# Patient Record
Sex: FEMALE | Race: WHITE | NOT HISPANIC OR LATINO | Employment: PART TIME | URBAN - NONMETROPOLITAN AREA
[De-identification: names, ages, dates, MRNs, and addresses within clinical notes are randomized per-mention and may not be internally consistent; named-entity substitution may affect disease eponyms.]

---
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
## 2018-03-14 ENCOUNTER — OFFICE VISIT (OUTPATIENT)
Dept: OBSTETRICS AND GYNECOLOGY | Facility: CLINIC | Age: 18
End: 2018-03-14

## 2018-03-14 VITALS
SYSTOLIC BLOOD PRESSURE: 100 MMHG | HEART RATE: 79 BPM | BODY MASS INDEX: 22.26 KG/M2 | DIASTOLIC BLOOD PRESSURE: 60 MMHG | WEIGHT: 130.4 LBS | RESPIRATION RATE: 14 BRPM | HEIGHT: 64 IN

## 2018-03-14 DIAGNOSIS — Z30.41 ORAL CONTRACEPTIVE PILL SURVEILLANCE: ICD-10-CM

## 2018-03-14 PROCEDURE — 99213 OFFICE O/P EST LOW 20 MIN: CPT | Performed by: NURSE PRACTITIONER

## 2018-03-14 RX ORDER — DESOGESTREL AND ETHINYL ESTRADIOL 0.15-0.03
1 KIT ORAL DAILY
Qty: 28 TABLET | Refills: 12 | Status: SHIPPED | OUTPATIENT
Start: 2018-03-14 | End: 2019-03-14

## 2018-03-14 NOTE — PROGRESS NOTES
Subjective   Kenia Casas is a 17 y.o. female.     History of Present Illness   Pt presents for 3 month follow up on initiation of OCP. Periods are regular, 6 days, moderate flow with mild cramps. Pt states she is doing well without any concerns. BP WNL. No weight gain, nausea, or headaches. Denies missing any pills.    Patient's last menstrual period was 03/05/2018 (approximate).    The following portions of the patient's history were reviewed and updated as appropriate: allergies, current medications, past family history, past medical history, past social history, past surgical history and problem list.    Review of Systems   Constitutional: Negative.  Negative for unexpected weight gain.   Respiratory: Negative.    Cardiovascular: Negative.    Gastrointestinal: Negative for nausea.   Genitourinary: Negative.  Negative for menstrual problem.   Neurological: Negative for headaches.       Objective    Vitals:    03/14/18 0906   BP: 100/60   Pulse: 79   Resp: 14     1    03/14/18  0906   Weight: 59.1 kg (130 lb 6.4 oz)     Body mass index is 22.38 kg/m².    Physical Exam   Constitutional: She is oriented to person, place, and time. She appears well-developed and well-nourished.   Cardiovascular: Normal rate, regular rhythm and normal heart sounds.    Pulmonary/Chest: Effort normal and breath sounds normal.   Neurological: She is alert and oriented to person, place, and time.   Psychiatric: She has a normal mood and affect. Her behavior is normal.   Vitals reviewed.    Assessment/Plan   Kenia was seen today for follow-up.    Diagnoses and all orders for this visit:    Oral contraceptive pill surveillance  -     desogestrel-ethinyl estradiol (APRI) 0.15-30 MG-MCG per tablet; Take 1 tablet by mouth Daily.      Continue OCP daily as prescribed. Reminded what to do if dose is missed. All questions answered. Follow up in 1 year or sooner PRN.

## 2019-03-14 DIAGNOSIS — Z30.41 ORAL CONTRACEPTIVE PILL SURVEILLANCE: ICD-10-CM

## 2019-03-18 RX ORDER — DESOGESTREL AND ETHINYL ESTRADIOL 0.15-0.03
KIT ORAL
Qty: 28 TABLET | Refills: 12 | OUTPATIENT
Start: 2019-03-18

## 2019-03-19 ENCOUNTER — OFFICE VISIT (OUTPATIENT)
Dept: OBSTETRICS AND GYNECOLOGY | Facility: CLINIC | Age: 19
End: 2019-03-19

## 2019-03-19 VITALS
SYSTOLIC BLOOD PRESSURE: 102 MMHG | DIASTOLIC BLOOD PRESSURE: 60 MMHG | WEIGHT: 134.8 LBS | HEIGHT: 64 IN | HEART RATE: 76 BPM | BODY MASS INDEX: 23.01 KG/M2

## 2019-03-19 DIAGNOSIS — Z30.41 ORAL CONTRACEPTIVE PILL SURVEILLANCE: Primary | ICD-10-CM

## 2019-03-19 PROBLEM — L30.9 ECZEMA: Status: ACTIVE | Noted: 2018-12-27

## 2019-03-19 PROCEDURE — 99213 OFFICE O/P EST LOW 20 MIN: CPT | Performed by: NURSE PRACTITIONER

## 2019-03-19 RX ORDER — DESOGESTREL AND ETHINYL ESTRADIOL 0.15-0.03
1 KIT ORAL DAILY
Qty: 28 TABLET | Refills: 12 | Status: SHIPPED | OUTPATIENT
Start: 2019-03-19 | End: 2020-03-23 | Stop reason: SDUPTHER

## 2019-03-19 RX ORDER — DESOGESTREL AND ETHINYL ESTRADIOL 0.15-0.03
1 KIT ORAL DAILY
COMMUNITY
Start: 2018-11-26 | End: 2019-03-19 | Stop reason: SDUPTHER

## 2019-03-19 NOTE — PROGRESS NOTES
"Subjective   Kenia Casas is a 18 y.o. female.     History of Present Illness   Pt presents for annual refill on OCPs. She denies any problems. Periods are regular, 4-5 days, moderate flow and mild cramping. She has been without it for a couple of days due to being out of refills.     Pt is getting  June 1st 2019. She asks about the need to stop OCP to prevent \"negative effects on fertility\". I provided counseling and information on this myth and that unless she was ready to conceive, I would highly recommend against stopping hormonal contraceptives. She voices understanding and agreement and wishes to remain on OCP.     The following portions of the patient's history were reviewed and updated as appropriate: allergies, current medications, past family history, past medical history, past social history, past surgical history and problem list.    Review of Systems   Constitutional: Negative.  Negative for chills and fever.   Respiratory: Negative.    Cardiovascular: Negative.    Gastrointestinal: Negative for nausea.   Genitourinary: Negative for breast discharge, menstrual problem, breast lump and breast pain.   Neurological: Negative for dizziness, seizures and headache.   Psychiatric/Behavioral: Negative.  Negative for depressed mood. The patient is not nervous/anxious.        Objective    Vitals:    03/19/19 1010   BP: 102/60   Pulse: 76         03/19/19  1010   Weight: 61.1 kg (134 lb 12.8 oz)     Body mass index is 23.14 kg/m².    Physical Exam   Constitutional: She is oriented to person, place, and time. She appears well-developed and well-nourished.   Cardiovascular: Normal rate, regular rhythm and normal heart sounds.   Pulmonary/Chest: Effort normal and breath sounds normal.   Neurological: She is alert and oriented to person, place, and time.   Psychiatric: She has a normal mood and affect. Her behavior is normal.   Vitals reviewed.    Assessment/Plan   Kenia was seen today for " contraception.    Diagnoses and all orders for this visit:    Oral contraceptive pill surveillance  -     desogestrel-ethinyl estradiol (JULEBER) 0.15-30 MG-MCG per tablet; Take 1 tablet by mouth Daily.      Continue OCP daily. I recommend quick restart as pt has been without OCP for a few days. Stressed the importance of using condoms during the first pack to prevent pregnancy. Pt voices understanding.     We discussed pap testing and examination at next visit as she will be 19. Pt voices understanding.     Denies any other concerns or questions today.

## 2020-03-23 DIAGNOSIS — Z30.41 ORAL CONTRACEPTIVE PILL SURVEILLANCE: ICD-10-CM

## 2020-03-23 RX ORDER — DESOGESTREL AND ETHINYL ESTRADIOL 0.15-0.03
1 KIT ORAL DAILY
Qty: 28 TABLET | Refills: 3 | Status: SHIPPED | OUTPATIENT
Start: 2020-03-23 | End: 2020-07-24

## 2020-07-24 ENCOUNTER — LAB (OUTPATIENT)
Dept: LAB | Facility: OTHER | Age: 20
End: 2020-07-24

## 2020-07-24 ENCOUNTER — OFFICE VISIT (OUTPATIENT)
Dept: OBSTETRICS AND GYNECOLOGY | Facility: CLINIC | Age: 20
End: 2020-07-24

## 2020-07-24 VITALS
HEIGHT: 64 IN | HEART RATE: 95 BPM | DIASTOLIC BLOOD PRESSURE: 60 MMHG | BODY MASS INDEX: 23.6 KG/M2 | SYSTOLIC BLOOD PRESSURE: 112 MMHG | WEIGHT: 138.2 LBS

## 2020-07-24 DIAGNOSIS — Z3A.01 LESS THAN 8 WEEKS GESTATION OF PREGNANCY: ICD-10-CM

## 2020-07-24 DIAGNOSIS — Z01.419 ENCOUNTER FOR GYNECOLOGICAL EXAMINATION WITH PAPANICOLAOU SMEAR OF CERVIX: Primary | ICD-10-CM

## 2020-07-24 LAB
ABO GROUP BLD: NORMAL
AMPHET+METHAMPHET UR QL: NEGATIVE
AMPHETAMINES UR QL: NEGATIVE
B-HCG UR QL: POSITIVE
BACTERIA UR QL AUTO: ABNORMAL /HPF
BARBITURATES UR QL SCN: NEGATIVE
BASOPHILS # BLD AUTO: 0.05 10*3/MM3 (ref 0–0.2)
BASOPHILS NFR BLD AUTO: 0.8 % (ref 0–1.5)
BENZODIAZ UR QL SCN: NEGATIVE
BILIRUB UR QL STRIP: NEGATIVE
BLD GP AB SCN SERPL QL: NEGATIVE
BUPRENORPHINE SERPL-MCNC: NEGATIVE NG/ML
CANNABINOIDS SERPL QL: NEGATIVE
CLARITY UR: ABNORMAL
COCAINE UR QL: NEGATIVE
COLOR UR: YELLOW
DEPRECATED RDW RBC AUTO: 39.1 FL (ref 37–54)
EOSINOPHIL # BLD AUTO: 0.1 10*3/MM3 (ref 0–0.4)
EOSINOPHIL NFR BLD AUTO: 1.6 % (ref 0.3–6.2)
ERYTHROCYTE [DISTWIDTH] IN BLOOD BY AUTOMATED COUNT: 12.4 % (ref 12.3–15.4)
GLUCOSE UR STRIP-MCNC: NEGATIVE MG/DL
HBV SURFACE AG SERPL QL IA: NORMAL
HCT VFR BLD AUTO: 38.8 % (ref 34–46.6)
HCV AB SER DONR QL: NORMAL
HGB BLD-MCNC: 13 G/DL (ref 12–15.9)
HGB UR QL STRIP.AUTO: ABNORMAL
HIV1+2 AB SER QL: NORMAL
HOLD SPECIMEN: NORMAL
HYALINE CASTS UR QL AUTO: ABNORMAL /LPF
IMM GRANULOCYTES # BLD AUTO: 0.03 10*3/MM3 (ref 0–0.05)
IMM GRANULOCYTES NFR BLD AUTO: 0.5 % (ref 0–0.5)
INTERNAL NEGATIVE CONTROL: NEGATIVE
INTERNAL POSITIVE CONTROL: POSITIVE
KETONES UR QL STRIP: ABNORMAL
LEUKOCYTE ESTERASE UR QL STRIP.AUTO: NEGATIVE
LYMPHOCYTES # BLD AUTO: 1.56 10*3/MM3 (ref 0.7–3.1)
LYMPHOCYTES NFR BLD AUTO: 24.6 % (ref 19.6–45.3)
Lab: ABNORMAL
Lab: NORMAL
MCH RBC QN AUTO: 29 PG (ref 26.6–33)
MCHC RBC AUTO-ENTMCNC: 33.5 G/DL (ref 31.5–35.7)
MCV RBC AUTO: 86.6 FL (ref 79–97)
METHADONE UR QL SCN: NEGATIVE
MONOCYTES # BLD AUTO: 0.39 10*3/MM3 (ref 0.1–0.9)
MONOCYTES NFR BLD AUTO: 6.1 % (ref 5–12)
MUCOUS THREADS URNS QL MICRO: ABNORMAL /HPF
NEUTROPHILS NFR BLD AUTO: 4.22 10*3/MM3 (ref 1.7–7)
NEUTROPHILS NFR BLD AUTO: 66.4 % (ref 42.7–76)
NITRITE UR QL STRIP: NEGATIVE
NRBC BLD AUTO-RTO: 0 /100 WBC (ref 0–0.2)
OPIATES UR QL: NEGATIVE
OXYCODONE UR QL SCN: NEGATIVE
PCP UR QL SCN: NEGATIVE
PH UR STRIP.AUTO: 8 [PH] (ref 5.5–8)
PLATELET # BLD AUTO: 196 10*3/MM3 (ref 140–450)
PMV BLD AUTO: 11.8 FL (ref 6–12)
PROPOXYPH UR QL: NEGATIVE
PROT UR QL STRIP: NEGATIVE
RBC # BLD AUTO: 4.48 10*6/MM3 (ref 3.77–5.28)
RBC # UR: ABNORMAL /HPF
REF LAB TEST METHOD: ABNORMAL
RH BLD: POSITIVE
RPR SER QL: NORMAL
RUBV IGG SERPL IA-ACNC: POSITIVE
SP GR UR STRIP: 1.02 (ref 1–1.03)
SQUAMOUS #/AREA URNS HPF: ABNORMAL /HPF
TRICYCLICS UR QL SCN: NEGATIVE
TSH SERPL DL<=0.05 MIU/L-ACNC: 2.71 UIU/ML (ref 0.27–4.2)
UROBILINOGEN UR QL STRIP: ABNORMAL
WBC # BLD AUTO: 6.35 10*3/MM3 (ref 3.4–10.8)
WBC UR QL AUTO: ABNORMAL /HPF

## 2020-07-24 PROCEDURE — 84443 ASSAY THYROID STIM HORMONE: CPT | Performed by: NURSE PRACTITIONER

## 2020-07-24 PROCEDURE — 81025 URINE PREGNANCY TEST: CPT | Performed by: NURSE PRACTITIONER

## 2020-07-24 PROCEDURE — 99395 PREV VISIT EST AGE 18-39: CPT | Performed by: NURSE PRACTITIONER

## 2020-07-24 PROCEDURE — 81001 URINALYSIS AUTO W/SCOPE: CPT | Performed by: NURSE PRACTITIONER

## 2020-07-24 PROCEDURE — 87086 URINE CULTURE/COLONY COUNT: CPT | Performed by: NURSE PRACTITIONER

## 2020-07-24 PROCEDURE — 80081 OBSTETRIC PANEL INC HIV TSTG: CPT | Performed by: NURSE PRACTITIONER

## 2020-07-24 PROCEDURE — 80306 DRUG TEST PRSMV INSTRMNT: CPT | Performed by: NURSE PRACTITIONER

## 2020-07-24 PROCEDURE — 36415 COLL VENOUS BLD VENIPUNCTURE: CPT | Performed by: NURSE PRACTITIONER

## 2020-07-24 PROCEDURE — 87491 CHLMYD TRACH DNA AMP PROBE: CPT | Performed by: NURSE PRACTITIONER

## 2020-07-24 PROCEDURE — 86803 HEPATITIS C AB TEST: CPT | Performed by: NURSE PRACTITIONER

## 2020-07-24 PROCEDURE — 87591 N.GONORRHOEAE DNA AMP PROB: CPT | Performed by: NURSE PRACTITIONER

## 2020-07-24 PROCEDURE — 87661 TRICHOMONAS VAGINALIS AMPLIF: CPT | Performed by: NURSE PRACTITIONER

## 2020-07-24 RX ORDER — PRENATAL VIT/IRON FUM/FOLIC AC 27MG-0.8MG
1 TABLET ORAL DAILY
Qty: 30 TABLET | Refills: 11 | Status: SHIPPED | OUTPATIENT
Start: 2020-07-24 | End: 2021-09-08

## 2020-07-24 NOTE — PROGRESS NOTES
Subjective   Chief Complaint   Patient presents with   • Gynecologic Exam     well woman annual    • Possible Pregnancy     Kenia Koch is a 19 y.o. year old  presenting to be seen for her annual exam.  Today she informs us that she just had a positive pregnancy test. Stopped OCPs after May. Patient's last menstrual period was 2020 (approximate). UPT positive in office. GA 7w2d, YUMI 3/10/21. Pt wishes to see Dr. Soler at the [place] location.     OB History        1    Para   0    Term   0       0    AB   0    Living   0       SAB   0    TAB   0    Ectopic   0    Molar   0    Multiple   0    Live Births   0                Current birth control method: none.    She is sexually active.  In the past 12 months there has not been new sexual partners.  Condoms are not typically used.  She would not like to be screened for STD's at today's exam.     Past 6 month menstrual history: :PRIOR TO PREGNANCY    Cycle Frequency: regular, predictable and consistent every 28 - 32 days   Menstrual cycle character: flow is typically normal   Cycle Duration: 3 - 5   Number of heavy days of flows: 0   Dysmenorrhea: is not affecting her activities of daily living   PMS: is not affecting her activities of daily living   Intermenstrual bleeding present: no   Post-coital bleeding present: no     She exercises regularly: yes.  She wears her seat belt:yes.  She has concerns about domestic violence: no.  Last colonoscopy: Never  Last DEXA: Never  Last MMG: Never  Last pap:  Never  History of abnormal PAP: N/A    The following portions of the patient's history were reviewed and updated as appropriate:problem list, current medications, allergies, past family history, past medical history, past social history and past surgical history.    Social History    Tobacco Use      Smoking status: Never Smoker      Smokeless tobacco: Never Used    Social History     Substance and Sexual Activity   Alcohol Use No      Review of  "Systems   Constitutional: Negative.  Negative for chills and fever.   Respiratory: Negative.    Cardiovascular: Negative.    Gastrointestinal: Negative.  Negative for abdominal pain, constipation, diarrhea, nausea and vomiting.   Endocrine: Negative.    Genitourinary: Negative.  Negative for dysuria, pelvic pain, vaginal bleeding, vaginal discharge and vaginal pain. Menstrual problem: missed period, positive pregnancy test.   Skin: Negative.    Neurological: Negative for dizziness, syncope, light-headedness and headaches.   Psychiatric/Behavioral: Negative for suicidal ideas. The patient is not nervous/anxious (suprised she got pregnant so quickly).          Objective   /60   Pulse 95   Ht 162.6 cm (64\")   Wt 62.7 kg (138 lb 3.2 oz)   LMP 06/03/2020 (Approximate)   Breastfeeding No   BMI 23.72 kg/m²     General:  well developed; well nourished  no acute distress   Skin:  No suspicious lesions seen   Thyroid: normal to inspection and palpation   Breasts:  Examined in supine position  Symmetric without masses or skin dimpling  Nipples normal without inversion, lesions or discharge  There are no palpable axillary nodes   Abdomen: soft, non-tender; no masses  no umbilical or inguinal hernias are present  no hepato-splenomegaly  Normal findings: soft, non-tender   Cardiac: Heart sounds are normal.  Regular rate and rhythm without murmur, gallop or rub.   Resp: Normal expansion.  Clear to auscultation.  No rales, rhonchi, or wheezing.   Psych: alert,oriented, in NAD with a full range of affect, normal behavior and no psychotic features   Pelvis: Uterus:  Clinical staff was present for exam  External genitalia:  normal appearance of the external genitalia including Bartholin's and [place]'s glands.  :  urethral meatus normal;  Vaginal:  normal pink mucosa without prolapse or lesions  Cervix:  normal appearance.  Uterus:  normal size, shape and consistency  Adnexa:  normal bimanual exam of the adnexa.  Rectal:  " digital rectal exam not performed; anus visually normal appearing.     Lab Review   No data reviewed    Imaging  No data reviewed       Diagnoses and all orders for this visit:    Encounter for gynecological examination with Papanicolaou smear of cervix  -     Liquid-based Pap Smear, Screening  RTC annually for well woman exams.     Less than 8 weeks gestation of pregnancy  -     OB Panel With HIV  -     TSH  -     Urinalysis With Microscopic - Urine, Clean Catch  -     Urine Culture - Urine, Urine, Clean Catch  -     Urine Drug Screen - Urine, Clean Catch  -     Chlamydia trachomatis, Neisseria gonorrhoeae, Trichomonas vaginalis, PCR - Urine, Urine, Clean Catch  -     Cancel: Hepatitis C Antibody  -     RPR  -     CBC Auto Differential  -     Hepatitis B Surface Antigen  -     Rubella Antibody, IgG  -     OB Panel Type & Screen  -     HIV-1 / O / 2 Ag / Antibody 4th Generation  -     Hepatitis C Antibody  -     Hep B Confirmation Tube  -     Urinalysis without microscopic (no culture) - Urine, Clean Catch  -     Urinalysis, Microscopic Only - Urine, Clean Catch  -     POC Pregnancy, Urine  -     PREVIOUS HISTORY; Future  -     PREVIOUS HISTORY  -     Prenatal Vit-Fe Fumarate-FA (PRENATAL VITAMIN 27-0.8) 27-0.8 MG tablet tablet; Take 1 tablet by mouth Daily.    UPT positive in office. She wants to see Dr. Soler. I will place referral for her to be seen in [place]. New OB bag and education provided. Counseled pt on diet, weight, BP, genetic screenings, exercise in pregnancy, and OTC medications in pregnancy. Discussed bleeding precautions.     This note was electronically signed.    Naomi Santiago, APRN  July 24, 2020

## 2020-07-25 LAB
BACTERIA SPEC AEROBE CULT: NO GROWTH
C TRACH RRNA CVX QL NAA+PROBE: NEGATIVE
N GONORRHOEA RRNA SPEC QL NAA+PROBE: NEGATIVE
TRICHOMONAS VAGINALIS PCR: NEGATIVE

## 2020-07-29 LAB
LAB AP CASE REPORT: NORMAL
PATH INTERP SPEC-IMP: NORMAL

## 2020-07-31 RX ORDER — CLOTRIMAZOLE 1 %
CREAM WITH APPLICATOR VAGINAL NIGHTLY
Qty: 45 G | Refills: 0 | Status: SHIPPED | OUTPATIENT
Start: 2020-07-31 | End: 2020-10-27

## 2020-08-06 ENCOUNTER — INITIAL PRENATAL (OUTPATIENT)
Dept: OBSTETRICS AND GYNECOLOGY | Facility: CLINIC | Age: 20
End: 2020-08-06

## 2020-08-06 ENCOUNTER — APPOINTMENT (OUTPATIENT)
Dept: LAB | Facility: HOSPITAL | Age: 20
End: 2020-08-06

## 2020-08-06 VITALS — DIASTOLIC BLOOD PRESSURE: 62 MMHG | WEIGHT: 141.6 LBS | SYSTOLIC BLOOD PRESSURE: 98 MMHG | BODY MASS INDEX: 24.31 KG/M2

## 2020-08-06 DIAGNOSIS — Z34.01 ENCOUNTER FOR SUPERVISION OF NORMAL FIRST PREGNANCY IN FIRST TRIMESTER: ICD-10-CM

## 2020-08-06 DIAGNOSIS — Z36.87 ENCOUNTER FOR ANTENATAL SCREENING FOR UNCERTAIN DATES: ICD-10-CM

## 2020-08-06 DIAGNOSIS — Z34.00 SUPERVISION OF NORMAL FIRST PREGNANCY, ANTEPARTUM: Primary | ICD-10-CM

## 2020-08-06 DIAGNOSIS — Z3A.09 9 WEEKS GESTATION OF PREGNANCY: Primary | ICD-10-CM

## 2020-08-06 PROCEDURE — 0501F PRENATAL FLOW SHEET: CPT | Performed by: NURSE PRACTITIONER

## 2020-08-06 NOTE — PROGRESS NOTES
"I spent approximately 40 minutes with the patient acquiring the health and history intake and discussing topics related to healthy lifestyle. Her LMP is approximately 6/3/20. her pregnancy was confirmed by Naomi Baker. She had her newob labs done at that office also and was given a newob bag. This is her first pregnancy. The 1st trimester teaching was done with the patient. We discussed a healthy diet and exercise and what is recommended. I also discussed Listeriosis and Toxoplasmosis and what fish to avoid due to high mercury levels. Informed patient not to be in hot tubs, saunas, or tanning beds. We discussed that spotting may occur after intercourse which is common, but if heavy bleeding like a period occurs to call the [facility] or hospital if clinic is closed.  I encouraged her to make an appointment with the dentist if she has not had a dental exam and cleaning in the last 6 months. She said she just had her dental appointment. The patient denies use of alcohol, illicit drug use, and tobacco smoking. I did tell her that a UDS will be done at the time of delivery also.  She plans to breastfeed. I gave her pamphlet on breastfeeding classes and the breastfeeding mothers support group that is provided by Stephanie Rodas, Lactation Consultant. She filled out the health department referral form and depression screening questionnaire. She scored a \"0\" on the depression questionnaire. I gave her a HANDS pamphlet.  I informed patient that group prenatal education classes are offered here. I instructed patient to let the provider know if she would like to join a group after EDC is established. A Centering Pregnancy pamphlet is given.  I discussed with the patient that a pediatrician needs to be chosen prior to delivery for the infant to have an appointment scheduled before leaving the hospital. I encouraged the patient to get the TDAP vaccine in the 3rd trimester. She just had a pap smear in July, so she is up " to date on her pap smear. She plans to see Dr. Soler in [place] throughout her pregnancy. All questions were answered at this time.

## 2020-08-06 NOTE — PROGRESS NOTES
[facility]  Obstetrics Visit    CHIEF COMPLAINT:  New prenatal visit    HISTORY OF PRESENT ILLNESS:  Kenia Koch is a 19 y.o. y/o  at 9w1d by LMP (Patient's last menstrual period was 2020 (approximate).).  This was a planned pregnancy, patient reports she stopped oral contraception in May.  She denies any vaginal bleeding.  She has started taking a prenatal vitamin.    EPDS: 0    REVIEW OF SYSTEMS  Review of Systems   Constitutional: Negative for activity change, appetite change, chills, diaphoresis, fatigue, fever, unexpected weight gain and unexpected weight loss.   Respiratory: Negative for apnea, chest tightness and shortness of breath.    Cardiovascular: Negative for chest pain and palpitations.   Gastrointestinal: Negative for abdominal distention, abdominal pain, constipation, diarrhea, nausea and vomiting.   Genitourinary: Negative for amenorrhea, breast discharge, breast lump, breast pain, decreased libido, decreased urine volume, difficulty urinating, dyspareunia, dysuria, flank pain, frequency, genital sores, hematuria, pelvic pain, pelvic pressure, urgency, urinary incontinence, vaginal bleeding, vaginal discharge and vaginal pain.   Musculoskeletal: Negative for myalgias.   Skin: Negative for color change, dry skin and skin lesions.   Neurological: Negative for light-headedness and headache.   Psychiatric/Behavioral: Negative for agitation, dysphoric mood, sleep disturbance, suicidal ideas, depressed mood and stress. The patient is not nervous/anxious.        PRENATAL RISK FACTORS   Problems (from 18 to present)     No problems associated with this episode.          DATING CRITERIA:  LMP 2020 YUMI 03/10/2020  1TUS (2020 at 8w4d) -- YUMI 2021    OBSTETRIC HISTORY:  OB History    Para Term  AB Living   1 0 0 0 0 0   SAB TAB Ectopic Molar Multiple Live Births   0 0 0 0 0 0      # Outcome Date GA Lbr Yordy/2nd Weight Sex Delivery Anes PTL  Lv   1 Current              GYN HISTORY:  Denies h/o sexually transmitted infections/pelvic inflammatory disease  Denies h/o abnormal pap smears  Last pap smear:   Last Completed Pap Smear       Status Date      PAP SMEAR Done 7/24/2020 LIQUID-BASED PAP SMEAR, SCREENING        Denies h/o gynecologic surgeries, including biopsies of the cervix    PAST MEDICAL HISTORY:  No past medical history on file.  PAST SURGICAL HISTORY:  Past Surgical History:   Procedure Laterality Date   • ADENOIDECTOMY     • TONSILLECTOMY     • WISDOM TOOTH EXTRACTION       FAMILY HISTORY:  Family History   Problem Relation Age of Onset   • Hypertension Father    • No Known Problems Mother    • Hypertension Paternal Grandfather    • Heart block Paternal Grandfather    • ALS Paternal Grandmother    • No Known Problems Maternal Grandmother    • No Known Problems Half-Brother      SOCIAL HISTORY:  Social History     Socioeconomic History   • Marital status: Single     Spouse name: Not on file   • Number of children: Not on file   • Years of education: Not on file   • Highest education level: Not on file   Tobacco Use   • Smoking status: Never Smoker   • Smokeless tobacco: Never Used   Substance and Sexual Activity   • Alcohol use: No   • Drug use: No   • Sexual activity: Yes     Partners: Male     Comment: pap smear 7/24/20 negative      GENETIC SCREENING:  Age >34 yo as of YUMI: no  Thalassemia: no  NTD: no  CHD: no  Down Syndrome/MR/Fragile X/Autism: no  Ashkenazi Rastafarian with Luis-S[place]s, [place], familial dysautonomia: no  Sickle cell disease or trait: no  Hemophilia: no  Muscular dystrophy: no  Cystic fibrosis: no  Cheltenham's chorea: no  Birth defects: no  Genetic/chromosomal disorders: no    INFECTION HISTORY:  TB exposure: no  HSV: no  Illness since LMP: no  Prior GBS infected child: no  STIs: no    ALLERGIES:  No Known Allergies    MEDICATIONS:  Prior to Admission medications    Medication Sig Start Date End Date Taking? Authorizing  Provider   clotrimazole (LOTRIMIN) 1 % vaginal cream Insert  into the vagina Every Night. 20   Naomi Santiago, KELLI   Prenatal Vit-Fe Fumarate-FA (PRENATAL VITAMIN 27-0.8) 27-0.8 MG tablet tablet Take 1 tablet by mouth Daily. 20   Naomi Santiago, KELLI       PHYSICAL EXAM:   LMP 2020 (Approximate)   General: Alert, healthy, no distress, well nourished and well developed.  Neurologic: Alert, oriented to person, place, and time.  Gait normal.  Cranial nerves II-XII grossly intact.  HEENT: Normocephalic, atraumatic.  Extraocular muscles intact, pupils equal and reactive x2.    Teeth: Normal hygiene.  Neck: Supple, no adenopathy, thyroid normal size, non-tender, without nodularity, trachea midline.  Breasts: No masses, skin dimpling, skin retraction, nipple discharge, or asymmetry bilaterally.  Lungs: Normal respiratory effort.  Clear to auscultation bilaterally.  No wheezes, rhonci, or rales.  Heart: Regular rate and rhythm.  No murmer, rub or gallop.  Abdomen: Soft, non-tender, non-distended,no masses, no hepatosplenomegaly, no hernia.  Skin: No rash, no lesions.  Extremities: No cyanosis, clubbing or edema.    Prelim TVUS- single IUP with  bpm, gestational sac wnl, yolk sac visualized, right ovary wnl, left ovary small corpus luteum cyst present    IMPRESSION:  Kenia Koch is a 19 y.o.  at 9w1d for a new prenatal visit.    PLAN:  1.  IUP at 9w1d  - Options counseling performed and patient desires continuation of pregnancy to term   - Prenatal labs ordered  - Genetic testing, including cystic fibrosis, was discussed and patient declines  - Continue prenatal vitamins  - Weight gain counseling performed.   - Pregravid BMI 18.5-24.9: Recommend 25-35 lb  - Return to clinic in 4 weeks for return prenatal visit  - Reviewed COVID-19 visitation policy  - Reviewed COVID-19 precautions     Diagnosis Plan   1. 9 weeks gestation of pregnancy     2. Encounter for  supervision of normal first pregnancy in first trimester     3. Encounter for  screening for uncertain dates  US Ob Transvaginal     KELLI Pulido  2020  12:11

## 2020-08-27 ENCOUNTER — ROUTINE PRENATAL (OUTPATIENT)
Dept: OBSTETRICS AND GYNECOLOGY | Facility: CLINIC | Age: 20
End: 2020-08-27

## 2020-08-27 VITALS — DIASTOLIC BLOOD PRESSURE: 68 MMHG | WEIGHT: 138 LBS | SYSTOLIC BLOOD PRESSURE: 108 MMHG | BODY MASS INDEX: 23.69 KG/M2

## 2020-08-27 DIAGNOSIS — Z3A.12 12 WEEKS GESTATION OF PREGNANCY: ICD-10-CM

## 2020-08-27 DIAGNOSIS — Z34.01 ENCOUNTER FOR SUPERVISION OF NORMAL FIRST PREGNANCY IN FIRST TRIMESTER: Primary | ICD-10-CM

## 2020-08-27 PROBLEM — Z34.90 SUPERVISION OF NORMAL PREGNANCY: Status: ACTIVE | Noted: 2020-08-27

## 2020-08-27 PROCEDURE — 0502F SUBSEQUENT PRENATAL CARE: CPT | Performed by: OBSTETRICS & GYNECOLOGY

## 2020-08-27 NOTE — PROGRESS NOTES
CC: Prenatal visit    Kenia Koch is a 19 y.o.  at 12w1d.  Doing well.  Denies contractions, LOF, or VB.  Has some round ligament pain.    /68   Wt 62.6 kg (138 lb)   LMP 2020 (Approximate)   BMI 23.69 kg/m²   Fetal Heart Rate: 160     Problems (from 20 to present)     Problem Noted Resolved    Supervision of normal pregnancy 2020 by Jerri Soler MD No        A/P: Kenia Koch is a 19 y.o.  at 12w1d.  - RTC in 4 weeks  - Reviewed COVID-19 visitation policy  - Reviewed COVID-19 precautions     Diagnosis Plan   1. Encounter for supervision of normal first pregnancy in first trimester     2. 12 weeks gestation of pregnancy       Jerri Soler MD  2020  11:20

## 2020-09-24 ENCOUNTER — ROUTINE PRENATAL (OUTPATIENT)
Dept: OBSTETRICS AND GYNECOLOGY | Facility: CLINIC | Age: 20
End: 2020-09-24

## 2020-09-24 VITALS — WEIGHT: 143.4 LBS | DIASTOLIC BLOOD PRESSURE: 70 MMHG | BODY MASS INDEX: 24.61 KG/M2 | SYSTOLIC BLOOD PRESSURE: 110 MMHG

## 2020-09-24 DIAGNOSIS — Z3A.16 16 WEEKS GESTATION OF PREGNANCY: ICD-10-CM

## 2020-09-24 DIAGNOSIS — Z34.02 ENCOUNTER FOR SUPERVISION OF NORMAL FIRST PREGNANCY IN SECOND TRIMESTER: Primary | ICD-10-CM

## 2020-09-24 PROCEDURE — 0502F SUBSEQUENT PRENATAL CARE: CPT | Performed by: OBSTETRICS & GYNECOLOGY

## 2020-09-24 NOTE — PROGRESS NOTES
CC: Prenatal visit    Kenia Koch is a 19 y.o.  at 16w1d.  Doing well.  Denies contractions, LOF, or VB.      /70   Wt 65 kg (143 lb 6.4 oz)   LMP 2020 (Approximate)   BMI 24.61 kg/m²   Fetal Heart Rate: 162     Problems (from 20 to present)     Problem Noted Resolved    Supervision of normal pregnancy 2020 by Jerri Soler MD No        A/P: Kenia Koch is a 19 y.o.  at 16w1d.  - RTC in 4 weeks w/ anatomy US in [place]  - Reviewed COVID-19 visitation policy  - Reviewed COVID-19 precautions     Diagnosis Plan   1. Encounter for supervision of normal first pregnancy in second trimester  US Ob 14 + Weeks Single or First Gestation   2. 16 weeks gestation of pregnancy       Jerri Soler MD  2020  10:35 CDT

## 2020-10-27 ENCOUNTER — ROUTINE PRENATAL (OUTPATIENT)
Dept: OBSTETRICS AND GYNECOLOGY | Facility: CLINIC | Age: 20
End: 2020-10-27

## 2020-10-27 VITALS — SYSTOLIC BLOOD PRESSURE: 112 MMHG | DIASTOLIC BLOOD PRESSURE: 56 MMHG | WEIGHT: 151 LBS | BODY MASS INDEX: 25.92 KG/M2

## 2020-10-27 DIAGNOSIS — Z3A.20 20 WEEKS GESTATION OF PREGNANCY: Primary | ICD-10-CM

## 2020-10-27 DIAGNOSIS — Z34.02 ENCOUNTER FOR SUPERVISION OF NORMAL FIRST PREGNANCY IN SECOND TRIMESTER: ICD-10-CM

## 2020-10-27 PROCEDURE — 0502F SUBSEQUENT PRENATAL CARE: CPT | Performed by: NURSE PRACTITIONER

## 2020-10-27 NOTE — PROGRESS NOTES
CC: Prenatal visit    Kenia Koch is a 19 y.o.  at 20w6d.  Doing well.  No complaints.  Denies contractions, LOF, or VB.  Not feeling FM yet.      /56   Wt 68.5 kg (151 lb)   LMP 2020 (Approximate)   BMI 25.92 kg/m²     US prelim- fetus vertex, placenta anterior right, no previa, EFW 13 oz, all anatomy seen, 3vc, uterus, bilateral ovaries wnl, CL 4.22 cm    Fundal Height (cm): 21 cm  Fetal Heart Rate: 157 us     Problems (from 20 to present)     Problem Noted Resolved    Supervision of normal pregnancy 2020 by Jerri Soler MD No    Overview Signed 2020 10:36 AM by Jerri Soler MD     A pos / Rubella immune / GBS unk  Dating: LMP = 1TUS (8wk)  Genetics: Declined  Tdap: 28wk  Flu: Needs  Anatomy: 20wk  1h Glucola: 28wk  H&H/Plts: 28wk  Lab Results   Component Value Date    HGB 13.0 2020    HCT 38.8 2020     2020   Breast/BC undecided               A/P: Kenia Koch is a 19 y.o.  at 20w6d.  - RTC in 4 weeks for MARILIN appt or sooner      Diagnosis Plan   1. 20 weeks gestation of pregnancy     2. Encounter for supervision of normal first pregnancy in second trimester         KELLI Pulido  10/27/2020  14:34 CDT

## 2020-11-02 ENCOUNTER — TELEPHONE (OUTPATIENT)
Dept: OBSTETRICS AND GYNECOLOGY | Facility: CLINIC | Age: 20
End: 2020-11-02

## 2020-11-02 DIAGNOSIS — Z34.02 ENCOUNTER FOR SUPERVISION OF NORMAL FIRST PREGNANCY IN SECOND TRIMESTER: ICD-10-CM

## 2020-11-02 NOTE — TELEPHONE ENCOUNTER
I tried to call patient to talk to her about   Centering Pregnancy and to see if she is interested. She did not answer. I left a message for her to return my call.

## 2020-11-19 ENCOUNTER — ROUTINE PRENATAL (OUTPATIENT)
Dept: OBSTETRICS AND GYNECOLOGY | Facility: CLINIC | Age: 20
End: 2020-11-19

## 2020-11-19 VITALS — BODY MASS INDEX: 27.5 KG/M2 | WEIGHT: 160.2 LBS | DIASTOLIC BLOOD PRESSURE: 60 MMHG | SYSTOLIC BLOOD PRESSURE: 108 MMHG

## 2020-11-19 DIAGNOSIS — Z34.02 ENCOUNTER FOR SUPERVISION OF NORMAL FIRST PREGNANCY IN SECOND TRIMESTER: Primary | ICD-10-CM

## 2020-11-19 DIAGNOSIS — Z3A.24 24 WEEKS GESTATION OF PREGNANCY: ICD-10-CM

## 2020-11-19 PROCEDURE — 0502F SUBSEQUENT PRENATAL CARE: CPT | Performed by: OBSTETRICS & GYNECOLOGY

## 2020-11-19 NOTE — PROGRESS NOTES
CC: Prenatal visit    Kenia Koch is a 19 y.o.  at 24w1d.  Doing well.  Denies contractions, LOF, or VB.  Reports good FM.    /60   Wt 72.7 kg (160 lb 3.2 oz)   LMP 2020 (Approximate)   BMI 27.50 kg/m²   Fundal Height (cm): 24 cm  Fetal Heart Rate: 146     Problems (from 20 to present)     Problem Noted Resolved    Supervision of normal pregnancy 2020 by Jerri Soler MD No    Overview Addendum 2020 10:47 AM by Jerri Soler MD     A pos / Rubella immune / GBS unk  Dating: LMP = 1TUS (8wk)  Genetics: Declined  Tdap: 28wk  Flu: Declined  Anatomy: WNL  1h Glucola: 28wk  H&H/Plts: 28wk  Lab Results   Component Value Date    HGB 13.0 2020    HCT 38.8 2020     2020   Breast/BC undecided               A/P: Kenia Koch is a 19 y.o.  at 24w1d.  - RTC in 4 weeks   - 3T labs   - Tdap   - Needs breastpump script  - Declines flu  - Reviewed COVID-19 visitation policy  - Reviewed COVID-19 precautions     Diagnosis Plan   1. Encounter for supervision of normal first pregnancy in second trimester  CBC (No Diff)    Glucose, Post 50 Gm Glucola   2. 24 weeks gestation of pregnancy       Jerri Soler MD  2020  10:47 CST

## 2020-12-17 ENCOUNTER — ROUTINE PRENATAL (OUTPATIENT)
Dept: OBSTETRICS AND GYNECOLOGY | Facility: CLINIC | Age: 20
End: 2020-12-17

## 2020-12-17 ENCOUNTER — LAB (OUTPATIENT)
Dept: LAB | Facility: HOSPITAL | Age: 20
End: 2020-12-17

## 2020-12-17 VITALS — BODY MASS INDEX: 28.15 KG/M2 | SYSTOLIC BLOOD PRESSURE: 102 MMHG | DIASTOLIC BLOOD PRESSURE: 52 MMHG | WEIGHT: 164 LBS

## 2020-12-17 DIAGNOSIS — Z34.02 ENCOUNTER FOR SUPERVISION OF NORMAL FIRST PREGNANCY IN SECOND TRIMESTER: ICD-10-CM

## 2020-12-17 DIAGNOSIS — Z3A.28 28 WEEKS GESTATION OF PREGNANCY: Primary | ICD-10-CM

## 2020-12-17 DIAGNOSIS — Z23 NEED FOR TDAP VACCINATION: ICD-10-CM

## 2020-12-17 PROCEDURE — 0502F SUBSEQUENT PRENATAL CARE: CPT | Performed by: NURSE PRACTITIONER

## 2020-12-17 PROCEDURE — 90715 TDAP VACCINE 7 YRS/> IM: CPT | Performed by: NURSE PRACTITIONER

## 2020-12-17 PROCEDURE — 90471 IMMUNIZATION ADMIN: CPT | Performed by: NURSE PRACTITIONER

## 2020-12-17 NOTE — PROGRESS NOTES
CC: Prenatal visit    Kenia Koch is a 19 y.o.  at 28w1d.  Doing well.  No complaints.  Denies contractions, LOF, or VB.  Reports good FM.    /52   Wt 74.4 kg (164 lb)   LMP 2020 (Approximate)   BMI 28.15 kg/m²              Problems (from 20 to present)     Problem Noted Resolved    Supervision of normal pregnancy 2020 by Jerri Soler MD No    Overview Addendum 2020 10:47 AM by Jerri Soler MD     A pos / Rubella immune / GBS unk  Dating: LMP = 1TUS (8wk)  Genetics: Declined  Tdap: 28wk  Flu: Declined  Anatomy: WNL  1h Glucola: 28wk  H&H/Plts: 28wk  Lab Results   Component Value Date    HGB 13.0 2020    HCT 38.8 2020     2020   Breast/BC undecided               A/P: Kenia Koch is a 19 y.o.  at 28w1d.  Pt will come back for 1 hour ogtt  - RTC in 2 weeks for MARILIN appt     Diagnosis Plan   1. 28 weeks gestation of pregnancy     2. Encounter for supervision of normal first pregnancy in second trimester         KELLI Pulido  2020  10:39 CST

## 2020-12-21 ENCOUNTER — LAB (OUTPATIENT)
Dept: LAB | Facility: HOSPITAL | Age: 20
End: 2020-12-21

## 2020-12-21 DIAGNOSIS — Z34.02 ENCOUNTER FOR SUPERVISION OF NORMAL FIRST PREGNANCY IN SECOND TRIMESTER: ICD-10-CM

## 2020-12-21 LAB
DEPRECATED RDW RBC AUTO: 36.2 FL (ref 37–54)
ERYTHROCYTE [DISTWIDTH] IN BLOOD BY AUTOMATED COUNT: 11.9 % (ref 12.3–15.4)
GLUCOSE 1H P 100 G GLC PO SERPL-MCNC: 95 MG/DL (ref 60–140)
HCT VFR BLD AUTO: 33.6 % (ref 34–46.6)
HGB BLD-MCNC: 11 G/DL (ref 12–15.9)
MCH RBC QN AUTO: 27.9 PG (ref 26.6–33)
MCHC RBC AUTO-ENTMCNC: 32.7 G/DL (ref 31.5–35.7)
MCV RBC AUTO: 85.3 FL (ref 79–97)
PLATELET # BLD AUTO: 274 10*3/MM3 (ref 140–450)
PMV BLD AUTO: 10.2 FL (ref 6–12)
RBC # BLD AUTO: 3.94 10*6/MM3 (ref 3.77–5.28)
WBC # BLD AUTO: 11.6 10*3/MM3 (ref 3.4–10.8)

## 2020-12-21 PROCEDURE — 85027 COMPLETE CBC AUTOMATED: CPT

## 2020-12-21 PROCEDURE — 36415 COLL VENOUS BLD VENIPUNCTURE: CPT

## 2020-12-21 PROCEDURE — 82950 GLUCOSE TEST: CPT

## 2020-12-22 ENCOUNTER — TELEPHONE (OUTPATIENT)
Dept: OBSTETRICS AND GYNECOLOGY | Facility: CLINIC | Age: 20
End: 2020-12-22

## 2020-12-22 NOTE — TELEPHONE ENCOUNTER
Called and spoke with patient, notified of normal results. Patient verbalized understanding and did not have any questions or concerns at this time.

## 2020-12-22 NOTE — TELEPHONE ENCOUNTER
----- Message from Jerri Soler MD sent at 12/21/2020  6:09 PM CST -----  Please let her know that she is not anemic and she is not gestational diabetic.

## 2020-12-30 ENCOUNTER — ROUTINE PRENATAL (OUTPATIENT)
Dept: OBSTETRICS AND GYNECOLOGY | Facility: CLINIC | Age: 20
End: 2020-12-30

## 2020-12-30 VITALS — DIASTOLIC BLOOD PRESSURE: 64 MMHG | WEIGHT: 172 LBS | BODY MASS INDEX: 29.52 KG/M2 | SYSTOLIC BLOOD PRESSURE: 110 MMHG

## 2020-12-30 DIAGNOSIS — Z34.03 ENCOUNTER FOR SUPERVISION OF NORMAL FIRST PREGNANCY IN THIRD TRIMESTER: Primary | ICD-10-CM

## 2020-12-30 DIAGNOSIS — Z3A.30 30 WEEKS GESTATION OF PREGNANCY: ICD-10-CM

## 2020-12-30 PROCEDURE — 0502F SUBSEQUENT PRENATAL CARE: CPT | Performed by: OBSTETRICS & GYNECOLOGY

## 2020-12-30 NOTE — PROGRESS NOTES
CC: Prenatal visit    Kenia Koch is a 20 y.o.  at 30w0d.  Doing well.  Denies contractions, LOF, or VB.  Reports good FM.    /64   Wt 78 kg (172 lb)   LMP 2020 (Approximate)   BMI 29.52 kg/m²   Fundal Height (cm): 30 cm  Fetal Heart Rate: 154     Problems (from 20 to present)     Problem Noted Resolved    Supervision of normal pregnancy 2020 by Jerri Soler MD No    Overview Addendum 2020 10:25 AM by Jerri Soler MD     A pos / Rubella immune / GBS unk  Dating: LMP = 1TUS (8wk)  Genetics: Declined  Tdap:   Flu: Declined  Anatomy: WNL  1h Glucola: 95  H&H/Plts:  Lab Results   Component Value Date    HGB 11.0 (L) 2020    HCT 33.6 (L) 2020     2020   Breast/BC undecided             A/P: Kenia Koch is a 20 y.o.  at 30w0d.  - RTC in 2 weeks  - Reviewed COVID-19 visitation policy  - Reviewed COVID-19 precautions     Diagnosis Plan   1. Encounter for supervision of normal first pregnancy in third trimester     2. 30 weeks gestation of pregnancy       Jerri Soler MD  2020  10:25 CST

## 2021-01-13 ENCOUNTER — ROUTINE PRENATAL (OUTPATIENT)
Dept: OBSTETRICS AND GYNECOLOGY | Facility: CLINIC | Age: 21
End: 2021-01-13

## 2021-01-13 VITALS — WEIGHT: 173 LBS | DIASTOLIC BLOOD PRESSURE: 80 MMHG | BODY MASS INDEX: 29.7 KG/M2 | SYSTOLIC BLOOD PRESSURE: 118 MMHG

## 2021-01-13 DIAGNOSIS — Z34.03 ENCOUNTER FOR SUPERVISION OF NORMAL FIRST PREGNANCY IN THIRD TRIMESTER: Primary | ICD-10-CM

## 2021-01-13 DIAGNOSIS — Z3A.32 32 WEEKS GESTATION OF PREGNANCY: ICD-10-CM

## 2021-01-13 PROCEDURE — 0502F SUBSEQUENT PRENATAL CARE: CPT | Performed by: OBSTETRICS & GYNECOLOGY

## 2021-01-13 NOTE — PROGRESS NOTES
CC: Prenatal visit    Kenia Koch is a 20 y.o.  at 32w0d.  Doing well.  Denies contractions, LOF, or VB.  Reports good FM.    /80   Wt 78.5 kg (173 lb)   LMP 2020 (Approximate)   BMI 29.70 kg/m²   Fundal Height (cm): 32 cm  Fetal Heart Rate: 156     Problems (from 20 to present)     Problem Noted Resolved    Supervision of normal pregnancy 2020 by Jerri Soler MD No    Overview Addendum 2020 10:25 AM by Jerri Soler MD     A pos / Rubella immune / GBS unk  Dating: LMP = 1TUS (8wk)  Genetics: Declined  Tdap:   Flu: Declined  Anatomy: WNL  1h Glucola: 95  H&H/Plts:  Lab Results   Component Value Date    HGB 11.0 (L) 2020    HCT 33.6 (L) 2020     2020   Breast/BC undecided             A/P: Kenia Koch is a 20 y.o.  at 32w0d.  - RTC in 2 weeks  - Reviewed COVID-19 visitation policy  - Reviewed COVID-19 precautions     Diagnosis Plan   1. Encounter for supervision of normal first pregnancy in third trimester     2. 32 weeks gestation of pregnancy       Jerri Soler MD  2021  09:57 CST

## 2021-01-27 ENCOUNTER — ROUTINE PRENATAL (OUTPATIENT)
Dept: OBSTETRICS AND GYNECOLOGY | Facility: CLINIC | Age: 21
End: 2021-01-27

## 2021-01-27 VITALS — BODY MASS INDEX: 30.38 KG/M2 | SYSTOLIC BLOOD PRESSURE: 120 MMHG | DIASTOLIC BLOOD PRESSURE: 62 MMHG | WEIGHT: 177 LBS

## 2021-01-27 DIAGNOSIS — Z3A.34 34 WEEKS GESTATION OF PREGNANCY: Primary | ICD-10-CM

## 2021-01-27 DIAGNOSIS — Z34.03 ENCOUNTER FOR SUPERVISION OF NORMAL FIRST PREGNANCY IN THIRD TRIMESTER: ICD-10-CM

## 2021-01-27 PROCEDURE — 0502F SUBSEQUENT PRENATAL CARE: CPT | Performed by: NURSE PRACTITIONER

## 2021-01-27 NOTE — PROGRESS NOTES
CC: Prenatal visit    Kenia Koch is a 20 y.o.  at 34w0d.  Doing well.  No complaints.  Denies contractions, LOF, or VB.  Reports good FM.    /62   Wt 80.3 kg (177 lb)   LMP 2020 (Approximate)   BMI 30.38 kg/m²              Problems (from 20 to present)     Problem Noted Resolved    Supervision of normal pregnancy 2020 by Jerri Soler MD No    Overview Addendum 2020 10:25 AM by Jerri Soler MD     A pos / Rubella immune / GBS unk  Dating: LMP = 1TUS (8wk)  Genetics: Declined  Tdap:   Flu: Declined  Anatomy: WNL  1h Glucola: 95  H&H/Plts:  Lab Results   Component Value Date    HGB 11.0 (L) 2020    HCT 33.6 (L) 2020     2020   Breast/BC undecided               A/P: Kenia Koch is a 20 y.o.  at 34w0d.  - RTC in 2 weeks     Diagnosis Plan   1. 34 weeks gestation of pregnancy     2. Encounter for supervision of normal first pregnancy in third trimester         KELLI Pulido  2021  09:00 CST

## 2021-02-10 ENCOUNTER — ROUTINE PRENATAL (OUTPATIENT)
Dept: OBSTETRICS AND GYNECOLOGY | Facility: CLINIC | Age: 21
End: 2021-02-10

## 2021-02-10 VITALS — BODY MASS INDEX: 31.58 KG/M2 | SYSTOLIC BLOOD PRESSURE: 122 MMHG | WEIGHT: 184 LBS | DIASTOLIC BLOOD PRESSURE: 74 MMHG

## 2021-02-10 DIAGNOSIS — Z34.03 ENCOUNTER FOR SUPERVISION OF NORMAL FIRST PREGNANCY IN THIRD TRIMESTER: Primary | ICD-10-CM

## 2021-02-10 DIAGNOSIS — Z3A.36 36 WEEKS GESTATION OF PREGNANCY: ICD-10-CM

## 2021-02-10 PROCEDURE — 0502F SUBSEQUENT PRENATAL CARE: CPT | Performed by: OBSTETRICS & GYNECOLOGY

## 2021-02-10 PROCEDURE — 87653 STREP B DNA AMP PROBE: CPT | Performed by: OBSTETRICS & GYNECOLOGY

## 2021-02-10 NOTE — PROGRESS NOTES
CC: Prenatal visit    Kenia Koch is a 20 y.o.  at 36w0d.  Doing well.  Denies contractions, LOF, or VB.  Reports good FM.    /74   Wt 83.5 kg (184 lb)   LMP 2020 (Approximate)   BMI 31.58 kg/m²   SVE: closed/thick/high/firm/mid, vertex  Fundal Height (cm): 35 cm  Fetal Heart Rate: 148     Problems (from 20 to present)     Problem Noted Resolved    Supervision of normal pregnancy 2020 by Jerri Soler MD No    Overview Addendum 2020 10:25 AM by Jerri Soler MD     A pos / Rubella immune / GBS unk  Dating: LMP = 1TUS (8wk)  Genetics: Declined  Tdap:   Flu: Declined  Anatomy: WNL  1h Glucola: 95  H&H/Plts:  Lab Results   Component Value Date    HGB 11.0 (L) 2020    HCT 33.6 (L) 2020     2020   Breast/BC undecided             A/P: Kenia Koch is a 20 y.o.  at 36w0d.  - RTC in 1 week  - Declines EIOL; desires spontaneous labor.  Discussed L&D coverage.  - Reviewed COVID-19 visitation policy  - Reviewed COVID-19 precautions     Diagnosis Plan   1. Encounter for supervision of normal first pregnancy in third trimester     2. 36 weeks gestation of pregnancy  Group B Strep (Molecular) - Swab, Vaginal/Rectum     Jerri Soler MD  2/10/2021  09:43 CST

## 2021-02-11 LAB — GROUP B STREP, DNA: NEGATIVE

## 2021-02-24 ENCOUNTER — ROUTINE PRENATAL (OUTPATIENT)
Dept: OBSTETRICS AND GYNECOLOGY | Facility: CLINIC | Age: 21
End: 2021-02-24

## 2021-02-24 VITALS — SYSTOLIC BLOOD PRESSURE: 132 MMHG | BODY MASS INDEX: 32.1 KG/M2 | WEIGHT: 187 LBS | DIASTOLIC BLOOD PRESSURE: 84 MMHG

## 2021-02-24 DIAGNOSIS — Z3A.38 38 WEEKS GESTATION OF PREGNANCY: Primary | ICD-10-CM

## 2021-02-24 DIAGNOSIS — Z34.03 ENCOUNTER FOR SUPERVISION OF NORMAL FIRST PREGNANCY IN THIRD TRIMESTER: ICD-10-CM

## 2021-02-24 PROCEDURE — 0502F SUBSEQUENT PRENATAL CARE: CPT | Performed by: NURSE PRACTITIONER

## 2021-02-24 NOTE — PROGRESS NOTES
CC: Prenatal visit    Kenia Koch is a 20 y.o.  at 38w0d.  Doing well.  No complaints.  Denies contractions, LOF, or VB.  Reports good FM.    /84   Wt 84.8 kg (187 lb)   LMP 2020 (Approximate)   BMI 32.10 kg/m²      Re-checked BP manually in left arm: 128/82  SVE: offered, pt declines today  Fundal Height (cm): 38 cm  Fetal Heart Rate: 140     Problems (from 20 to present)     Problem Noted Resolved    Supervision of normal pregnancy 2020 by Jerri Soler MD No    Overview Addendum 2020 10:25 AM by Jerri Soler MD     A pos / Rubella immune / GBS unk  Dating: LMP = 1TUS (8wk)  Genetics: Declined  Tdap:   Flu: Declined  Anatomy: WNL  1h Glucola: 95  H&H/Plts:  Lab Results   Component Value Date    HGB 11.0 (L) 2020    HCT 33.6 (L) 2020     2020   Breast/BC undecided               A/P: Kenia Koch is a 20 y.o.  at 38w0d.  Pt educated on warning signs of PreE, pt has capability to check BP at home if needed  Also discussed true labor signs  She may call OB/GYN triage line if needed after hours  - RTC in 1 weeks for MARILIN appt or sooner if needed      Diagnosis Plan   1. 38 weeks gestation of pregnancy     2. Encounter for supervision of normal first pregnancy in third trimester         KELLI Pulido  2021  09:58 CST

## 2021-03-02 ENCOUNTER — ROUTINE PRENATAL (OUTPATIENT)
Dept: OBSTETRICS AND GYNECOLOGY | Facility: CLINIC | Age: 21
End: 2021-03-02

## 2021-03-02 VITALS — SYSTOLIC BLOOD PRESSURE: 134 MMHG | DIASTOLIC BLOOD PRESSURE: 80 MMHG | WEIGHT: 190 LBS | BODY MASS INDEX: 32.61 KG/M2

## 2021-03-02 DIAGNOSIS — Z34.03 ENCOUNTER FOR SUPERVISION OF NORMAL FIRST PREGNANCY IN THIRD TRIMESTER: Primary | ICD-10-CM

## 2021-03-02 DIAGNOSIS — Z3A.38 38 WEEKS GESTATION OF PREGNANCY: ICD-10-CM

## 2021-03-02 PROCEDURE — 0502F SUBSEQUENT PRENATAL CARE: CPT | Performed by: OBSTETRICS & GYNECOLOGY

## 2021-03-02 RX ORDER — SODIUM CHLORIDE 0.9 % (FLUSH) 0.9 %
3-10 SYRINGE (ML) INJECTION AS NEEDED
Status: CANCELLED | OUTPATIENT
Start: 2021-03-02

## 2021-03-02 RX ORDER — CARBOPROST TROMETHAMINE 250 UG/ML
250 INJECTION, SOLUTION INTRAMUSCULAR AS NEEDED
Status: CANCELLED | OUTPATIENT
Start: 2021-03-02

## 2021-03-02 RX ORDER — ONDANSETRON 2 MG/ML
4 INJECTION INTRAMUSCULAR; INTRAVENOUS EVERY 6 HOURS PRN
Status: CANCELLED | OUTPATIENT
Start: 2021-03-02

## 2021-03-02 RX ORDER — ACETAMINOPHEN 325 MG/1
1000 TABLET ORAL EVERY 6 HOURS
Status: CANCELLED | OUTPATIENT
Start: 2021-03-02

## 2021-03-02 RX ORDER — MISOPROSTOL 100 MCG
50 TABLET ORAL EVERY 6 HOURS
Status: CANCELLED | OUTPATIENT
Start: 2021-03-02 | End: 2021-03-03

## 2021-03-02 RX ORDER — IBUPROFEN 200 MG
800 TABLET ORAL EVERY 8 HOURS
Status: CANCELLED | OUTPATIENT
Start: 2021-03-02

## 2021-03-02 RX ORDER — PROMETHAZINE HYDROCHLORIDE 25 MG/1
25 TABLET ORAL EVERY 6 HOURS PRN
Status: CANCELLED | OUTPATIENT
Start: 2021-03-02

## 2021-03-02 RX ORDER — MISOPROSTOL 100 UG/1
800 TABLET ORAL AS NEEDED
Status: CANCELLED | OUTPATIENT
Start: 2021-03-02

## 2021-03-02 RX ORDER — SODIUM CHLORIDE 0.9 % (FLUSH) 0.9 %
3 SYRINGE (ML) INJECTION EVERY 12 HOURS SCHEDULED
Status: CANCELLED | OUTPATIENT
Start: 2021-03-02

## 2021-03-02 RX ORDER — BUTORPHANOL TARTRATE 1 MG/ML
2 INJECTION, SOLUTION INTRAMUSCULAR; INTRAVENOUS
Status: CANCELLED | OUTPATIENT
Start: 2021-03-02

## 2021-03-02 RX ORDER — OXYTOCIN 10 [USP'U]/ML
85 INJECTION, SOLUTION INTRAMUSCULAR; INTRAVENOUS ONCE
Status: CANCELLED | OUTPATIENT
Start: 2021-03-02

## 2021-03-02 RX ORDER — LIDOCAINE HYDROCHLORIDE 10 MG/ML
5 INJECTION, SOLUTION EPIDURAL; INFILTRATION; INTRACAUDAL; PERINEURAL AS NEEDED
Status: CANCELLED | OUTPATIENT
Start: 2021-03-02

## 2021-03-02 RX ORDER — OXYTOCIN 10 [USP'U]/ML
650 INJECTION, SOLUTION INTRAMUSCULAR; INTRAVENOUS ONCE
Status: CANCELLED | OUTPATIENT
Start: 2021-03-02

## 2021-03-02 RX ORDER — ONDANSETRON 4 MG/1
4 TABLET, FILM COATED ORAL EVERY 6 HOURS PRN
Status: CANCELLED | OUTPATIENT
Start: 2021-03-02

## 2021-03-02 RX ORDER — PROMETHAZINE HYDROCHLORIDE 25 MG/1
12.5 SUPPOSITORY RECTAL EVERY 6 HOURS PRN
Status: CANCELLED | OUTPATIENT
Start: 2021-03-02

## 2021-03-02 RX ORDER — METHYLERGONOVINE MALEATE 0.2 MG/ML
200 INJECTION INTRAVENOUS ONCE AS NEEDED
Status: CANCELLED | OUTPATIENT
Start: 2021-03-02

## 2021-03-02 RX ORDER — SODIUM CHLORIDE, SODIUM LACTATE, POTASSIUM CHLORIDE, CALCIUM CHLORIDE 600; 310; 30; 20 MG/100ML; MG/100ML; MG/100ML; MG/100ML
125 INJECTION, SOLUTION INTRAVENOUS CONTINUOUS
Status: CANCELLED | OUTPATIENT
Start: 2021-03-02

## 2021-03-02 NOTE — H&P
HISTORY & PHYSICAL - Obstetrics  [facility]    Name: Kenia Koch  MRN: [id number]  Location: Room/bed info not found  Date: 2021  CSN: [id number]      CHIEF COMPLAINT:  IOL for late term at 41wks    HISTORY OF PRESENT ILLNESS  Kenia Koch is a 20 y.o.  at 38w6d gestational age by LMP = 1TUS suggesting Estimated Date of Delivery: 3/10/21.  The patient presents for scheduled RPN.  She is scheduled for IOL at 41 weeks per patient request.  Today denies LOF, vaginal bleeding, or regular contractions.  Reports good FM.    Patient denies any chest pain, palpitations, headaches, lightheadedness, shortness of breath, cough, nausea, vomiting, diarrhea, constipation, fever, or chills.    ROS  Review of Systems   Constitutional: Negative.    HENT: Negative.    Eyes: Negative.    Respiratory: Negative.    Cardiovascular: Negative.    Gastrointestinal: Negative.    Endocrine: Negative.    Genitourinary: Negative.    Musculoskeletal: Negative.    Skin: Negative.    Neurological: Negative.    Hematological: Negative.    Psychiatric/Behavioral: Negative.      PRENATAL LAB RESULTS  Prenatal labs reviewed  External Prenatal Results     Pregnancy Outside Results - Transcribed From Office Records - See Scanned Records For Details     Test Value Date Time    Hgb 11.0 g/dL 20 1023      13.0 g/dL 20 0932    Hct 33.6 % 20 1023      38.8 % 20 0932    ABO A  20 0932    Rh Positive  20 0932    Antibody Screen Negative  20 0932    Glucose Fasting GTT       Glucose Tolerance Test 1 hour       Glucose Tolerance Test 3 hour       Gonorrhea (discrete) Negative  20 0918    Chlamydia (discrete) Negative  20 0918    RPR Non-Reactive  20 0932    VDRL       Syphilis Antibody       Rubella Positive  20 0932    HBsAg Non-Reactive  20 0932    Herpes Simplex Virus PCR       Herpes Simplex VIrus Culture       HIV Non-Reactive  20 0932    Hep C RNA  Quant PCR       Hep C Antibody Non-Reactive  20 0932    AFP       Group B Strep Negative  02/10/21 0937    GBS Susceptibility to Clindamycin       GBS Susceptibility to Erythromycin       Fetal Fibronectin       Genetic Testing, Maternal Blood             Drug Screening     Test Value Date Time    Urine Drug Screen       Amphetamine Screen Negative  20 0918    Barbiturate Screen Negative  20 0918    Benzodiazepine Screen Negative  20 0918    Methadone Screen Negative  20 0918    Phencyclidine Screen Negative  20 0918    Opiates Screen Negative  20 0918    THC Screen Negative  20 0918    Cocaine Screen       Propoxyphene Screen Negative  20 0918    Buprenorphine Screen Negative  20 0918    Methamphetamine Screen       Oxycodone Screen Negative  20 0918    Tricyclic Antidepressants Screen Negative  20 0918              PRENATAL RISK FACTORS   Problems (from 20 to present)     Problem Noted Resolved    Supervision of normal pregnancy 2020 by Jerri Soler MD No    Overview Addendum 2020 10:25 AM by Jerri Soler MD     A pos / Rubella immune / GBS unk  Dating: LMP = 1TUS (8wk)  Genetics: Declined  Tdap:   Flu: Declined  Anatomy: WNL  1h Glucola: 95  H&H/Plts:  Lab Results   Component Value Date    HGB 11.0 (L) 2020    HCT 33.6 (L) 2020     2020   Breast/BC undecided             OB HISTORY  OB History    Para Term  AB Living   1 0 0 0 0 0   SAB TAB Ectopic Molar Multiple Live Births   0 0 0 0 0 0      # Outcome Date GA Lbr Yordy/2nd Weight Sex Delivery Anes PTL Lv   1 Current              GYN HISTORY  Denies h/o sexually transmitted infections/pelvic inflammatory disease  Denies h/o gynecologic surgeries, including biopsies of the cervix    PAST MEDICAL HISTORY  History reviewed. No pertinent past medical history.    PAST SURGICAL HISTORY  Past Surgical  History:   Procedure Laterality Date   • ADENOIDECTOMY     • TONSILLECTOMY     • WISDOM TOOTH EXTRACTION       FAMILY HISTORY  Family History   Problem Relation Age of Onset   • Hypertension Father    • No Known Problems Mother    • Hypertension Paternal Grandfather    • Heart block Paternal Grandfather    • ALS Paternal Grandmother    • No Known Problems Maternal Grandmother    • No Known Problems Half-Brother      SOCIAL HISTORY  Social History     Socioeconomic History   • Marital status: Single     Spouse name: Not on file   • Number of children: Not on file   • Years of education: Not on file   • Highest education level: Not on file   Tobacco Use   • Smoking status: Never Smoker   • Smokeless tobacco: Never Used   Substance and Sexual Activity   • Alcohol use: No   • Drug use: No   • Sexual activity: Yes     Partners: Male     Comment: pap smear 20 negative      ALLERGIES  No Known Allergies    HOME MEDICATIONS  Prior to Admission medications    Medication Sig Start Date End Date Taking? Authorizing Provider   Prenatal Vit-Fe Fumarate-FA (PRENATAL VITAMIN 27-0.8) 27-0.8 MG tablet tablet Take 1 tablet by mouth Daily. 20  Yes Naomi Santiago, KELLI     PHYSICAL EXAM  /80   Wt 86.2 kg (190 lb)   LMP 2020 (Approximate)   BMI 32.61 kg/m²   General: No acute distress. Well developed, well nourished. Pleasant.  Heart: Regular rate and rhythm. No murmurs, rubs, or gallops  Lungs: Clear to auscultation bilaterally. No wheezes, rales, or rhonchi.  Abdomen: Soft, nontender to palpation, enlarged by gravid uterus.    SVE: closed/ 50/ -3/ soft/ mid, vertex    IMPRESSION  Kenia Koch is a 20 y.o.  scheduled for IOL at 41w1d secondary to late term gestation.  Given unfavorable cervix, will induce with cervical ripening.    PLAN  1.  IUP at 41w1d with late term  - Admit: Labor and Delivery  - Attending: Dr. Soler  - Condition: Stable  - Vitals: per protocol  - Activity: ad scarlett  -  Nursing: Continuous electronic fetal monitoring, as per protocol  - Diet: Clears  - IV fluids:  mL/hr  - Meds: SL cytotec  - Allergies: NKDA  - Labs: CBC, T&S, UDS  - GBS: neg.  Antibiotics:  NA  - Kenia Koch and I have discussed pain goals for this hospitalization after reviewing her current clinical condition, medical history and prior pain experiences.  The goal is to keep her pain level appropriate.  Patient considering an epidural  - Anticipate     This document has been electronically signed by Jerri Soler MD on 2021 10:12 CST.

## 2021-03-02 NOTE — H&P (VIEW-ONLY)
HISTORY & PHYSICAL - Obstetrics  [facility]    Name: Kenia Koch  MRN: [id number]  Location: Room/bed info not found  Date: 2021  CSN: [id number]      CHIEF COMPLAINT:  IOL for late term at 41wks    HISTORY OF PRESENT ILLNESS  Kenia Koch is a 20 y.o.  at 38w6d gestational age by LMP = 1TUS suggesting Estimated Date of Delivery: 3/10/21.  The patient presents for scheduled RPN.  She is scheduled for IOL at 41 weeks per patient request.  Today denies LOF, vaginal bleeding, or regular contractions.  Reports good FM.    Patient denies any chest pain, palpitations, headaches, lightheadedness, shortness of breath, cough, nausea, vomiting, diarrhea, constipation, fever, or chills.    ROS  Review of Systems   Constitutional: Negative.    HENT: Negative.    Eyes: Negative.    Respiratory: Negative.    Cardiovascular: Negative.    Gastrointestinal: Negative.    Endocrine: Negative.    Genitourinary: Negative.    Musculoskeletal: Negative.    Skin: Negative.    Neurological: Negative.    Hematological: Negative.    Psychiatric/Behavioral: Negative.      PRENATAL LAB RESULTS  Prenatal labs reviewed  External Prenatal Results     Pregnancy Outside Results - Transcribed From Office Records - See Scanned Records For Details     Test Value Date Time    Hgb 11.0 g/dL 20 1023      13.0 g/dL 20 0932    Hct 33.6 % 20 1023      38.8 % 20 0932    ABO A  20 0932    Rh Positive  20 0932    Antibody Screen Negative  20 0932    Glucose Fasting GTT       Glucose Tolerance Test 1 hour       Glucose Tolerance Test 3 hour       Gonorrhea (discrete) Negative  20 0918    Chlamydia (discrete) Negative  20 0918    RPR Non-Reactive  20 0932    VDRL       Syphilis Antibody       Rubella Positive  20 0932    HBsAg Non-Reactive  20 0932    Herpes Simplex Virus PCR       Herpes Simplex VIrus Culture       HIV Non-Reactive  20 0932    Hep C RNA  Quant PCR       Hep C Antibody Non-Reactive  20 0932    AFP       Group B Strep Negative  02/10/21 0937    GBS Susceptibility to Clindamycin       GBS Susceptibility to Erythromycin       Fetal Fibronectin       Genetic Testing, Maternal Blood             Drug Screening     Test Value Date Time    Urine Drug Screen       Amphetamine Screen Negative  20 0918    Barbiturate Screen Negative  20 0918    Benzodiazepine Screen Negative  20 0918    Methadone Screen Negative  20 0918    Phencyclidine Screen Negative  20 0918    Opiates Screen Negative  20 0918    THC Screen Negative  20 0918    Cocaine Screen       Propoxyphene Screen Negative  20 0918    Buprenorphine Screen Negative  20 0918    Methamphetamine Screen       Oxycodone Screen Negative  20 0918    Tricyclic Antidepressants Screen Negative  20 0918              PRENATAL RISK FACTORS   Problems (from 20 to present)     Problem Noted Resolved    Supervision of normal pregnancy 2020 by Jerri Soler MD No    Overview Addendum 2020 10:25 AM by Jerri Soler MD     A pos / Rubella immune / GBS unk  Dating: LMP = 1TUS (8wk)  Genetics: Declined  Tdap:   Flu: Declined  Anatomy: WNL  1h Glucola: 95  H&H/Plts:  Lab Results   Component Value Date    HGB 11.0 (L) 2020    HCT 33.6 (L) 2020     2020   Breast/BC undecided             OB HISTORY  OB History    Para Term  AB Living   1 0 0 0 0 0   SAB TAB Ectopic Molar Multiple Live Births   0 0 0 0 0 0      # Outcome Date GA Lbr Yordy/2nd Weight Sex Delivery Anes PTL Lv   1 Current              GYN HISTORY  Denies h/o sexually transmitted infections/pelvic inflammatory disease  Denies h/o gynecologic surgeries, including biopsies of the cervix    PAST MEDICAL HISTORY  History reviewed. No pertinent past medical history.    PAST SURGICAL HISTORY  Past Surgical  History:   Procedure Laterality Date   • ADENOIDECTOMY     • TONSILLECTOMY     • WISDOM TOOTH EXTRACTION       FAMILY HISTORY  Family History   Problem Relation Age of Onset   • Hypertension Father    • No Known Problems Mother    • Hypertension Paternal Grandfather    • Heart block Paternal Grandfather    • ALS Paternal Grandmother    • No Known Problems Maternal Grandmother    • No Known Problems Half-Brother      SOCIAL HISTORY  Social History     Socioeconomic History   • Marital status: Single     Spouse name: Not on file   • Number of children: Not on file   • Years of education: Not on file   • Highest education level: Not on file   Tobacco Use   • Smoking status: Never Smoker   • Smokeless tobacco: Never Used   Substance and Sexual Activity   • Alcohol use: No   • Drug use: No   • Sexual activity: Yes     Partners: Male     Comment: pap smear 20 negative      ALLERGIES  No Known Allergies    HOME MEDICATIONS  Prior to Admission medications    Medication Sig Start Date End Date Taking? Authorizing Provider   Prenatal Vit-Fe Fumarate-FA (PRENATAL VITAMIN 27-0.8) 27-0.8 MG tablet tablet Take 1 tablet by mouth Daily. 20  Yes Naomi Santiago, KELLI     PHYSICAL EXAM  /80   Wt 86.2 kg (190 lb)   LMP 2020 (Approximate)   BMI 32.61 kg/m²   General: No acute distress. Well developed, well nourished. Pleasant.  Heart: Regular rate and rhythm. No murmurs, rubs, or gallops  Lungs: Clear to auscultation bilaterally. No wheezes, rales, or rhonchi.  Abdomen: Soft, nontender to palpation, enlarged by gravid uterus.    SVE: closed/ 50/ -3/ soft/ mid, vertex    IMPRESSION  Kenia Koch is a 20 y.o.  scheduled for IOL at 41w1d secondary to late term gestation.  Given unfavorable cervix, will induce with cervical ripening.    PLAN  1.  IUP at 41w1d with late term  - Admit: Labor and Delivery  - Attending: Dr. Soler  - Condition: Stable  - Vitals: per protocol  - Activity: ad scarlett  -  Nursing: Continuous electronic fetal monitoring, as per protocol  - Diet: Clears  - IV fluids:  mL/hr  - Meds: SL cytotec  - Allergies: NKDA  - Labs: CBC, T&S, UDS  - GBS: neg.  Antibiotics:  NA  - Kenia Koch and I have discussed pain goals for this hospitalization after reviewing her current clinical condition, medical history and prior pain experiences.  The goal is to keep her pain level appropriate.  Patient considering an epidural  - Anticipate     This document has been electronically signed by Jerri Soler MD on 2021 10:12 CST.

## 2021-03-02 NOTE — PROGRESS NOTES
CC: Prenatal visit    Kenia Koch is a 20 y.o.  at 38w6d.  Doing well.  Denies contractions, LOF, or VB.  Reports good FM.    /80   Wt 86.2 kg (190 lb)   LMP 2020 (Approximate)   BMI 32.61 kg/m²   SVE: closed/50/-3/soft/mid, vertex  Fundal Height (cm): 36 cm  Fetal Heart Rate: 140     Problems (from 20 to present)     Problem Noted Resolved    Supervision of normal pregnancy 2020 by Jerri Soler MD No    Overview Addendum 2020 10:25 AM by Jerri Soler MD     A pos / Rubella immune / GBS unk  Dating: LMP = 1TUS (8wk)  Genetics: Declined  Tdap:   Flu: Declined  Anatomy: WNL  1h Glucola: 95  H&H/Plts:  Lab Results   Component Value Date    HGB 11.0 (L) 2020    HCT 33.6 (L) 2020     2020   Breast/BC undecided               A/P: Kenia Koch is a 20 y.o.  at 38w6d.  - RTC in 1 weeks  - IOL for 41wk scheduled 3/18  - Reviewed COVID-19 visitation policy  - Reviewed COVID-19 precautions     Diagnosis Plan   1. Encounter for supervision of normal first pregnancy in third trimester     2. 38 weeks gestation of pregnancy       Jerri Soler MD  3/2/2021  10:11 CST

## 2021-03-09 ENCOUNTER — ROUTINE PRENATAL (OUTPATIENT)
Dept: OBSTETRICS AND GYNECOLOGY | Facility: CLINIC | Age: 21
End: 2021-03-09

## 2021-03-09 VITALS — WEIGHT: 189.2 LBS | BODY MASS INDEX: 32.48 KG/M2 | SYSTOLIC BLOOD PRESSURE: 128 MMHG | DIASTOLIC BLOOD PRESSURE: 78 MMHG

## 2021-03-09 DIAGNOSIS — Z34.03 ENCOUNTER FOR SUPERVISION OF NORMAL FIRST PREGNANCY IN THIRD TRIMESTER: Primary | ICD-10-CM

## 2021-03-09 DIAGNOSIS — Z3A.39 39 WEEKS GESTATION OF PREGNANCY: ICD-10-CM

## 2021-03-09 PROCEDURE — 0502F SUBSEQUENT PRENATAL CARE: CPT | Performed by: OBSTETRICS & GYNECOLOGY

## 2021-03-09 NOTE — PROGRESS NOTES
CC: Prenatal visit    Kenia Koch is a 20 y.o.  at 39w6d.  Doing well.  Denies contractions, LOF, or VB.  Reports good FM.    /78   Wt 85.8 kg (189 lb 3.2 oz)   LMP 2020 (Approximate)   BMI 32.48 kg/m²   SVE: 1/thick/high/firm/mid  Fundal Height (cm): 37 cm  Fetal Heart Rate: 149     Problems (from 20 to present)     Problem Noted Resolved    Supervision of normal pregnancy 2020 by Jerri Soler MD No    Overview Addendum 2020 10:25 AM by Jerri Soler MD     A pos / Rubella immune / GBS unk  Dating: LMP = 1TUS (8wk)  Genetics: Declined  Tdap:   Flu: Declined  Anatomy: WNL  1h Glucola: 95  H&H/Plts:  Lab Results   Component Value Date    HGB 11.0 (L) 2020    HCT 33.6 (L) 2020     2020   Breast/BC undecided         Previous Version        A/P: Kenia Koch is a 20 y.o.  at 39w6d.  - RTC in 1 week  - IOL scheduled for 3/18 secondary to late term  - Reviewed COVID-19 visitation policy  - Reviewed COVID-19 precautions     Diagnosis Plan   1. Encounter for supervision of normal first pregnancy in third trimester     2. 39 weeks gestation of pregnancy       Jerri Soler MD  3/9/2021  09:32 CST

## 2021-03-18 ENCOUNTER — HOSPITAL ENCOUNTER (INPATIENT)
Facility: HOSPITAL | Age: 21
LOS: 1 days | Discharge: HOME | End: 2021-03-19
Attending: OBSTETRICS & GYNECOLOGY | Admitting: OBSTETRICS & GYNECOLOGY

## 2021-03-18 ENCOUNTER — HOSPITAL ENCOUNTER (INPATIENT)
Dept: LABOR AND DELIVERY | Facility: HOSPITAL | Age: 21
Discharge: HOME | End: 2021-03-18

## 2021-03-18 DIAGNOSIS — Z34.03 ENCOUNTER FOR SUPERVISION OF NORMAL FIRST PREGNANCY IN THIRD TRIMESTER: ICD-10-CM

## 2021-03-18 PROBLEM — Z3A.41 POST TERM PREGNANCY, 41 WEEKS: Status: ACTIVE | Noted: 2021-03-18

## 2021-03-18 PROBLEM — O48.0 POST TERM PREGNANCY, 41 WEEKS: Status: ACTIVE | Noted: 2021-03-18

## 2021-03-18 LAB
ABO GROUP BLD: NORMAL
AMPHET+METHAMPHET UR QL: NEGATIVE
AMPHETAMINES UR QL: NEGATIVE
BARBITURATES UR QL SCN: NEGATIVE
BENZODIAZ UR QL SCN: NEGATIVE
BLD GP AB SCN SERPL QL: NEGATIVE
BUPRENORPHINE SERPL-MCNC: NEGATIVE NG/ML
CANNABINOIDS SERPL QL: NEGATIVE
COCAINE UR QL: NEGATIVE
DEPRECATED RDW RBC AUTO: 41.3 FL (ref 37–54)
ERYTHROCYTE [DISTWIDTH] IN BLOOD BY AUTOMATED COUNT: 16.6 % (ref 12.3–15.4)
HCT VFR BLD AUTO: 30.8 % (ref 34–46.6)
HGB BLD-MCNC: 9.4 G/DL (ref 12–15.9)
HOLD SPECIMEN: NORMAL
Lab: NORMAL
MCH RBC QN AUTO: 21.5 PG (ref 26.6–33)
MCHC RBC AUTO-ENTMCNC: 30.5 G/DL (ref 31.5–35.7)
MCV RBC AUTO: 70.5 FL (ref 79–97)
METHADONE UR QL SCN: NEGATIVE
OPIATES UR QL: NEGATIVE
OXYCODONE UR QL SCN: NEGATIVE
PCP UR QL SCN: NEGATIVE
PLATELET # BLD AUTO: 225 10*3/MM3 (ref 140–450)
PMV BLD AUTO: 11 FL (ref 6–12)
PROPOXYPH UR QL: NEGATIVE
RBC # BLD AUTO: 4.37 10*6/MM3 (ref 3.77–5.28)
RH BLD: POSITIVE
T&S EXPIRATION DATE: NORMAL
TRICYCLICS UR QL SCN: NEGATIVE
WBC # BLD AUTO: 12.7 10*3/MM3 (ref 3.4–10.8)

## 2021-03-18 PROCEDURE — 25010000002 METHYLERGONOVINE MALEATE PER 0.2 MG

## 2021-03-18 PROCEDURE — 10907ZC DRAINAGE OF AMNIOTIC FLUID, THERAPEUTIC FROM PRODUCTS OF CONCEPTION, VIA NATURAL OR ARTIFICIAL OPENING: ICD-10-PCS | Performed by: OBSTETRICS & GYNECOLOGY

## 2021-03-18 PROCEDURE — 0KQM0ZZ REPAIR PERINEUM MUSCLE, OPEN APPROACH: ICD-10-PCS | Performed by: OBSTETRICS & GYNECOLOGY

## 2021-03-18 PROCEDURE — 59400 OBSTETRICAL CARE: CPT | Performed by: OBSTETRICS & GYNECOLOGY

## 2021-03-18 PROCEDURE — 85027 COMPLETE CBC AUTOMATED: CPT | Performed by: OBSTETRICS & GYNECOLOGY

## 2021-03-18 PROCEDURE — 3E033VJ INTRODUCTION OF OTHER HORMONE INTO PERIPHERAL VEIN, PERCUTANEOUS APPROACH: ICD-10-PCS | Performed by: OBSTETRICS & GYNECOLOGY

## 2021-03-18 PROCEDURE — 86901 BLOOD TYPING SEROLOGIC RH(D): CPT | Performed by: OBSTETRICS & GYNECOLOGY

## 2021-03-18 PROCEDURE — 86900 BLOOD TYPING SEROLOGIC ABO: CPT | Performed by: OBSTETRICS & GYNECOLOGY

## 2021-03-18 PROCEDURE — 86850 RBC ANTIBODY SCREEN: CPT | Performed by: OBSTETRICS & GYNECOLOGY

## 2021-03-18 PROCEDURE — 80306 DRUG TEST PRSMV INSTRMNT: CPT | Performed by: OBSTETRICS & GYNECOLOGY

## 2021-03-18 RX ORDER — HYDROCORTISONE 25 MG/G
1 CREAM TOPICAL AS NEEDED
Status: DISCONTINUED | OUTPATIENT
Start: 2021-03-18 | End: 2021-03-19 | Stop reason: HOSPADM

## 2021-03-18 RX ORDER — IBUPROFEN 800 MG/1
800 TABLET ORAL EVERY 8 HOURS
Status: DISCONTINUED | OUTPATIENT
Start: 2021-03-18 | End: 2021-03-18 | Stop reason: HOSPADM

## 2021-03-18 RX ORDER — LIDOCAINE HYDROCHLORIDE 10 MG/ML
5 INJECTION, SOLUTION EPIDURAL; INFILTRATION; INTRACAUDAL; PERINEURAL AS NEEDED
Status: DISCONTINUED | OUTPATIENT
Start: 2021-03-18 | End: 2021-03-18 | Stop reason: HOSPADM

## 2021-03-18 RX ORDER — METHYLERGONOVINE MALEATE 0.2 MG/ML
200 INJECTION INTRAVENOUS ONCE AS NEEDED
Status: DISCONTINUED | OUTPATIENT
Start: 2021-03-18 | End: 2021-03-18 | Stop reason: HOSPADM

## 2021-03-18 RX ORDER — OXYTOCIN/0.9 % SODIUM CHLORIDE 30/500 ML
85 PLASTIC BAG, INJECTION (ML) INTRAVENOUS ONCE
Status: DISCONTINUED | OUTPATIENT
Start: 2021-03-18 | End: 2021-03-19 | Stop reason: HOSPADM

## 2021-03-18 RX ORDER — IBUPROFEN 800 MG/1
800 TABLET ORAL EVERY 8 HOURS
Status: DISCONTINUED | OUTPATIENT
Start: 2021-03-18 | End: 2021-03-19 | Stop reason: HOSPADM

## 2021-03-18 RX ORDER — PROMETHAZINE HYDROCHLORIDE 12.5 MG/1
12.5 SUPPOSITORY RECTAL EVERY 6 HOURS PRN
Status: DISCONTINUED | OUTPATIENT
Start: 2021-03-18 | End: 2021-03-18 | Stop reason: HOSPADM

## 2021-03-18 RX ORDER — OXYTOCIN/0.9 % SODIUM CHLORIDE 30/500 ML
PLASTIC BAG, INJECTION (ML) INTRAVENOUS
Status: COMPLETED
Start: 2021-03-18 | End: 2021-03-18

## 2021-03-18 RX ORDER — MISOPROSTOL 100 MCG
50 TABLET ORAL EVERY 6 HOURS
Status: DISCONTINUED | OUTPATIENT
Start: 2021-03-18 | End: 2021-03-18

## 2021-03-18 RX ORDER — OXYTOCIN/0.9 % SODIUM CHLORIDE 30/500 ML
85 PLASTIC BAG, INJECTION (ML) INTRAVENOUS ONCE
Status: DISCONTINUED | OUTPATIENT
Start: 2021-03-18 | End: 2021-03-18 | Stop reason: HOSPADM

## 2021-03-18 RX ORDER — PRENATAL VIT/IRON FUM/FOLIC AC 27MG-0.8MG
1 TABLET ORAL DAILY
Status: DISCONTINUED | OUTPATIENT
Start: 2021-03-19 | End: 2021-03-19 | Stop reason: HOSPADM

## 2021-03-18 RX ORDER — ONDANSETRON 2 MG/ML
4 INJECTION INTRAMUSCULAR; INTRAVENOUS EVERY 6 HOURS PRN
Status: DISCONTINUED | OUTPATIENT
Start: 2021-03-18 | End: 2021-03-19 | Stop reason: HOSPADM

## 2021-03-18 RX ORDER — SODIUM CHLORIDE 0.9 % (FLUSH) 0.9 %
3-10 SYRINGE (ML) INJECTION AS NEEDED
Status: DISCONTINUED | OUTPATIENT
Start: 2021-03-18 | End: 2021-03-18 | Stop reason: HOSPADM

## 2021-03-18 RX ORDER — CARBOPROST TROMETHAMINE 250 UG/ML
250 INJECTION, SOLUTION INTRAMUSCULAR AS NEEDED
Status: DISCONTINUED | OUTPATIENT
Start: 2021-03-18 | End: 2021-03-18 | Stop reason: HOSPADM

## 2021-03-18 RX ORDER — ONDANSETRON 4 MG/1
4 TABLET, FILM COATED ORAL EVERY 6 HOURS PRN
Status: DISCONTINUED | OUTPATIENT
Start: 2021-03-18 | End: 2021-03-18 | Stop reason: HOSPADM

## 2021-03-18 RX ORDER — PROMETHAZINE HYDROCHLORIDE 25 MG/1
25 TABLET ORAL EVERY 6 HOURS PRN
Status: DISCONTINUED | OUTPATIENT
Start: 2021-03-18 | End: 2021-03-18 | Stop reason: HOSPADM

## 2021-03-18 RX ORDER — SODIUM CHLORIDE, SODIUM LACTATE, POTASSIUM CHLORIDE, CALCIUM CHLORIDE 600; 310; 30; 20 MG/100ML; MG/100ML; MG/100ML; MG/100ML
125 INJECTION, SOLUTION INTRAVENOUS CONTINUOUS
Status: DISCONTINUED | OUTPATIENT
Start: 2021-03-18 | End: 2021-03-19

## 2021-03-18 RX ORDER — ONDANSETRON 4 MG/1
4 TABLET, FILM COATED ORAL EVERY 6 HOURS PRN
Status: DISCONTINUED | OUTPATIENT
Start: 2021-03-18 | End: 2021-03-19 | Stop reason: HOSPADM

## 2021-03-18 RX ORDER — ACETAMINOPHEN 500 MG
1000 TABLET ORAL EVERY 6 HOURS
Status: DISCONTINUED | OUTPATIENT
Start: 2021-03-18 | End: 2021-03-18 | Stop reason: HOSPADM

## 2021-03-18 RX ORDER — DOCUSATE SODIUM 100 MG/1
100 CAPSULE, LIQUID FILLED ORAL 2 TIMES DAILY
Status: DISCONTINUED | OUTPATIENT
Start: 2021-03-18 | End: 2021-03-19 | Stop reason: HOSPADM

## 2021-03-18 RX ORDER — SODIUM CHLORIDE 0.9 % (FLUSH) 0.9 %
3 SYRINGE (ML) INJECTION EVERY 12 HOURS SCHEDULED
Status: DISCONTINUED | OUTPATIENT
Start: 2021-03-18 | End: 2021-03-18 | Stop reason: HOSPADM

## 2021-03-18 RX ORDER — OXYTOCIN/0.9 % SODIUM CHLORIDE 30/500 ML
650 PLASTIC BAG, INJECTION (ML) INTRAVENOUS ONCE
Status: DISCONTINUED | OUTPATIENT
Start: 2021-03-18 | End: 2021-03-19 | Stop reason: HOSPADM

## 2021-03-18 RX ORDER — CALCIUM CARBONATE 200(500)MG
2 TABLET,CHEWABLE ORAL 3 TIMES DAILY PRN
Status: DISCONTINUED | OUTPATIENT
Start: 2021-03-18 | End: 2021-03-19 | Stop reason: HOSPADM

## 2021-03-18 RX ORDER — ACETAMINOPHEN 500 MG
1000 TABLET ORAL EVERY 6 HOURS
Status: DISCONTINUED | OUTPATIENT
Start: 2021-03-18 | End: 2021-03-19 | Stop reason: HOSPADM

## 2021-03-18 RX ORDER — LANOLIN 100 %
OINTMENT (GRAM) TOPICAL
Status: DISCONTINUED | OUTPATIENT
Start: 2021-03-18 | End: 2021-03-19 | Stop reason: HOSPADM

## 2021-03-18 RX ORDER — MISOPROSTOL 200 UG/1
800 TABLET ORAL AS NEEDED
Status: DISCONTINUED | OUTPATIENT
Start: 2021-03-18 | End: 2021-03-18 | Stop reason: HOSPADM

## 2021-03-18 RX ORDER — SODIUM CHLORIDE 0.9 % (FLUSH) 0.9 %
1-10 SYRINGE (ML) INJECTION AS NEEDED
Status: DISCONTINUED | OUTPATIENT
Start: 2021-03-18 | End: 2021-03-19 | Stop reason: HOSPADM

## 2021-03-18 RX ORDER — FERROUS SULFATE TAB EC 324 MG (65 MG FE EQUIVALENT) 324 (65 FE) MG
324 TABLET DELAYED RESPONSE ORAL 2 TIMES DAILY WITH MEALS
Status: DISCONTINUED | OUTPATIENT
Start: 2021-03-18 | End: 2021-03-19 | Stop reason: HOSPADM

## 2021-03-18 RX ORDER — METHYLERGONOVINE MALEATE 0.2 MG/ML
INJECTION INTRAVENOUS
Status: COMPLETED
Start: 2021-03-18 | End: 2021-03-18

## 2021-03-18 RX ORDER — ONDANSETRON 2 MG/ML
4 INJECTION INTRAMUSCULAR; INTRAVENOUS EVERY 6 HOURS PRN
Status: DISCONTINUED | OUTPATIENT
Start: 2021-03-18 | End: 2021-03-18 | Stop reason: HOSPADM

## 2021-03-18 RX ORDER — OXYTOCIN/0.9 % SODIUM CHLORIDE 30/500 ML
650 PLASTIC BAG, INJECTION (ML) INTRAVENOUS ONCE
Status: COMPLETED | OUTPATIENT
Start: 2021-03-18 | End: 2021-03-18

## 2021-03-18 RX ORDER — BISACODYL 10 MG
10 SUPPOSITORY, RECTAL RECTAL DAILY PRN
Status: DISCONTINUED | OUTPATIENT
Start: 2021-03-19 | End: 2021-03-19 | Stop reason: HOSPADM

## 2021-03-18 RX ADMIN — OXYTOCIN-SODIUM CHLORIDE 0.9% IV SOLN 30 UNIT/500ML 30 UNITS: 30-0.9/5 SOLUTION at 15:12

## 2021-03-18 RX ADMIN — SODIUM CHLORIDE, POTASSIUM CHLORIDE, SODIUM LACTATE AND CALCIUM CHLORIDE 125 ML/HR: 600; 310; 30; 20 INJECTION, SOLUTION INTRAVENOUS at 04:40

## 2021-03-18 RX ADMIN — SODIUM CHLORIDE, POTASSIUM CHLORIDE, SODIUM LACTATE AND CALCIUM CHLORIDE 125 ML/HR: 600; 310; 30; 20 INJECTION, SOLUTION INTRAVENOUS at 12:23

## 2021-03-18 RX ADMIN — METHYLERGONOVINE MALEATE 200 MCG: 0.2 INJECTION INTRAVENOUS at 17:30

## 2021-03-18 RX ADMIN — OXYTOCIN-SODIUM CHLORIDE 0.9% IV SOLN 30 UNIT/500ML 85 ML/HR: 30-0.9/5 SOLUTION at 15:56

## 2021-03-18 RX ADMIN — MISOPROSTOL 50 MCG: 100 TABLET ORAL at 04:47

## 2021-03-18 RX ADMIN — METHYLERGONOVINE MALEATE 200 MCG: 0.2 INJECTION, SOLUTION INTRAMUSCULAR; INTRAVENOUS at 17:30

## 2021-03-18 RX ADMIN — LIDOCAINE HYDROCHLORIDE 10 ML: 10 INJECTION, SOLUTION EPIDURAL; INFILTRATION; INTRACAUDAL; PERINEURAL at 15:11

## 2021-03-18 NOTE — PROGRESS NOTES
S/p SL Cytotec x1  Cat 1 FHT w/ toco difficult to trace  SVE 8/90/-1/soft/mid  AROM clear fluid  Declines epidural  Will watch labor s/p amniotomy before augmenting w/ pitocin unless contractions space out    Jerri Soler MD  3/18/2021  11:54 CDT

## 2021-03-18 NOTE — PLAN OF CARE
Goal Outcome Evaluation:  Plan of Care Reviewed With: patient, significant other  Progress: improving  Outcome Summary: managing bleeding with methergine, pitocin bolus, pt voided, denies pain

## 2021-03-18 NOTE — INTERVAL H&P NOTE
H&P reviewed. The patient was examined and there are no changes to the H&P. No concerns.     /87 (BP Location: Right arm, Patient Position: Lying)   Pulse 112   Temp 98 °F (36.7 °C) (Oral)   Resp 18   Wt 86.2 kg (190 lb)   LMP 2020 (Approximate)   SpO2 98%   Breastfeeding Yes   BMI 32.61 kg/m²   Gen: NAD, AAO x3  SVE: /-3/firm/post    A/P: Kenia Koch is a 20 y.o.  at 41w1d admitted for IOL for late term gestation.  - SL Cytotec 50 mcg  - GBS neg    This document has been electronically signed by Jerri Soler MD on 2021 07:08 CDT.

## 2021-03-19 VITALS
DIASTOLIC BLOOD PRESSURE: 69 MMHG | RESPIRATION RATE: 18 BRPM | HEART RATE: 83 BPM | WEIGHT: 190 LBS | SYSTOLIC BLOOD PRESSURE: 121 MMHG | TEMPERATURE: 97.9 F | BODY MASS INDEX: 32.61 KG/M2 | OXYGEN SATURATION: 99 %

## 2021-03-19 PROCEDURE — 0503F POSTPARTUM CARE VISIT: CPT | Performed by: OBSTETRICS & GYNECOLOGY

## 2021-03-19 RX ORDER — IBUPROFEN 800 MG/1
800 TABLET ORAL EVERY 6 HOURS PRN
Qty: 60 TABLET | Refills: 2 | Status: SHIPPED | OUTPATIENT
Start: 2021-03-19 | End: 2021-09-08

## 2021-03-19 RX ORDER — ACETAMINOPHEN 325 MG/1
650 TABLET ORAL EVERY 4 HOURS PRN
Qty: 100 TABLET | Refills: 2 | Status: SHIPPED | OUTPATIENT
Start: 2021-03-19 | End: 2021-09-08

## 2021-03-19 RX ADMIN — FERROUS SULFATE TAB EC 324 MG (65 MG FE EQUIVALENT) 324 MG: 324 (65 FE) TABLET DELAYED RESPONSE at 08:32

## 2021-03-19 RX ADMIN — DOCUSATE SODIUM 100 MG: 100 CAPSULE, LIQUID FILLED ORAL at 08:32

## 2021-03-19 RX ADMIN — IBUPROFEN 800 MG: 800 TABLET, FILM COATED ORAL at 08:32

## 2021-03-19 RX ADMIN — PRENATAL VIT W/ FE FUMARATE-FA TAB 27-0.8 MG 1 TABLET: 27-0.8 TAB at 08:32

## 2021-03-19 NOTE — DISCHARGE SUMMARY
[facility]  Kenia Alejandra Zee  : 2000  MRN: [id number]  CSN: [id number]    Discharge Summary:    Date of Admission: 3/18/2021  Date of Discharge:  3/19/2021    Admitting Diagnosis:  1. Intrauterine pregnancy @ 41w1d  2. for induction of labor     Discharge Diagnosis:  1. Status post        History and Hospital Course:  Patient is a   who presents for induction of labor.  Her pregnancy was complicated by postdates.  Please see History and Physical for full details.       She was admitted and progressed in labor with pitocin augmentation and epidural analgesia to completely dilated. She had a vaginal delivery of a  viable female   infant who weighed 4020 g (8 lb 13.8 oz)  and APGARs of        APGARS  One minute Five minutes Ten minutes Fifteen minutes Twenty minutes   Skin color: 0   0             Heart rate: 2   2             Grimace: 2   2              Muscle tone: 2   2              Breathin   2              Totals: 8   8              . No immediate complications were encountered. Please see procedure note for full details.      Her postpartum course has been unremarkable. She had no signs or symptoms of acute blood loss anemia. She was ambulating well, voiding without difficulty and lochia was within normal limits. She was breast feeding without difficulty. She was stable for discharge on postpartum day 1.      Precautions and instructions were discussed with her including but not limited to maintaining a regular diet at home, practicing local hygiene, pelvic rest, and signs and symptoms to report including heavy bleeding, frequent passage of clots, fainting or dizziness, foul odor of lochia, increasing pain, fever, or any other concerns.    She was asked to follow up in the office in 4 weeks.    Condition: Stable  Discharge Disposition: home  Discharge Diet:   Diet Instructions     Diet: Regular      Discharge Diet: Regular        Activity at Discharge:   Activity Instructions      Bathing Restrictions      Type of Restriction: Bathing    Bathing Restrictions: Other    Explain Bathing Restrictions: No soaking in bathtub for 4 weeks, showers are fine.    Driving Restrictions      Type of Restriction: Driving    Driving Restrictions: No Driving (Time Limited)    Length: Other    Indicate Length of Restriction: No driving for 1 week or while on narcotic pain medications. Riding is car is fine.    Lifting Restrictions      Type of Restriction: Lifting    Lifting Restrictions: Other    Explain Lifing Restrictions: No lifting more than infant and baby carrier together for 6 weeks.    Pelvic Rest      Nothing in the vagina for 6 weeks to include tampons or intercourse.    Sexual Activity Restrictions      Type of Restriction: Sex    Explain Sexual Activity Restrictions: No sexual intercourse for at least 6 weeks        Discharge Medications:       Discharge Medications      New Medications      Instructions Start Date   acetaminophen 325 MG tablet  Commonly known as: Tylenol   650 mg, Oral, Every 4 Hours PRN      ibuprofen 800 MG tablet  Commonly known as: ADVIL,MOTRIN   800 mg, Oral, Every 6 Hours PRN         Continue These Medications      Instructions Start Date   prenatal vitamin 27-0.8 27-0.8 MG tablet tablet   1 tablet, Oral, Daily           Patient will restart all home medications including prenatal vitamins.        This document has been electronically signed by Vick Gunter DO on March 19, 2021 07:03 CDT

## 2021-03-19 NOTE — L&D DELIVERY NOTE
[facility]  Vaginal Delivery Note    Delivery     Delivery: Vaginal, Spontaneous     YOB: 2023    Time of Birth:  Gestational Age 2:26 PM   41w1d     Anesthesia: None     Delivering clinician: Jerri Soler    Forceps?   No   Vacuum? No    Shoulder dystocia present: Yes Shoulder Dystocia Details  Dystocia Present? Yes   Time head delivered: 3/18/2021  2:25 PM   Gentle attempt at traction, assisted by maternal expulsive forces:    If no, why:    Anterior shoulder:    Time recognized: 3/18/2021  2:26 PM     Time help called:   Called by:     Time provider arrived:   Provider who arrived:     Time NICU arrived:   NICU staff:     Time additional staff arrived:   Additional staff:            Delivery narrative:    Patient was found to have an anterior lip of cervix and was reduced with pushing.  Patient pushed to deliver a viable 4020g female from the NADINE position over an intact perineum via  without anesthesia on 3/18/2021 at 2:26PM.  No noted nuchal cord.  A shoulder dystocia was identified.  The patient was instructed to stop pushing.  No fundal pressure was applied.  The right shoulder was anterior.  Manuevers performed included: suprapubic pressure, McRobert's maneuver.  An episiotomy was not indicated.  Traction was put on the head for 10 seconds at 30 degree angle.  The time interval between delivery of the fetal head and body was 10 seconds.  Then the left posterior shoulder delivered with ease.  Body was then delivered with gentle traction.  Mouth and nose bulb suctioned.  3 vessel cord clamped x2 and cut after 30 seconds of delayed clamping.  Baby was placed on mother's chest.  Cord blood was obtained and sent.  Placenta delivered spontaneously intact and Pitocin was started.  Cervix, vagina, and perineum were examined and a small 2nd degree laceration was noted.  3-0 Vicryl was used to repair the laceration in a routine fashion with good hemostasis.  EBL 300mL; QBL  pending, please see nursing documentation.  Apgar scores 8/8.  Mother and infant stable.   examined and no evidence of brachial plexus injury or fracture noted, as well as good movement of both arms.  Delivery information was discussed with patient and family.    Infant    Findings: female  infant     Infant observations: Weight: 4020 g (8 lb 13.8 oz)   Length: 21  in  Observations/Comments:        Apgars: 8  @ 1 minute /    8  @ 5 minutes   Infant Name: Ny     Placenta, Cord, and Fluid    Placenta delivered  Spontaneous  at   3/18/2021  2:36 PM     Cord: 3 vessels  present.   Nuchal Cord?  no   Cord blood obtained: Yes    Cord gases obtained:  No    Cord gas results: Venous:  No results found for: PHCVEN    Arterial:  No results found for: PHCART     Repair    Episiotomy: None     No    Lacerations: Yes  Laceration Information  Laceration Repaired?   Perineal: 2nd  Yes    Periurethral:       Labial:       Sulcus:       Vaginal: Yes  No    Cervical:         Suture used for repair: 3-0 Vicryl   Estimated Blood Loss: Est. Blood Loss (mL): 300 mL (Filed from Delivery Summary) (21 [id number])         Complications  Shoulder dystocia    Disposition  Mother to Mother Baby/Postpartum in stable condition currently.  Baby to NBN in stable condition currently.    Jerri Soler MD  21  21:28 CDT

## 2021-03-19 NOTE — PROGRESS NOTES
[facility]  Progress Note - Obstetrics    Name: Kenia Koch  MRN: [id number]  Location: M755/1  Date: 3/19/2021  CSN: [id number]       PPD #1 s/p  at 41w with 1d term labor complicated by minor shoulder dystocia and minor laceration    Patient seen and examined. Mild, cramping pain. Comments on some bleeding yesterday that resolved with methergine injection.  Denies headache, dizziness, chest pain, shortness of breath, nausea, vomiting.  Minimal/moderate lochia.  OOB and ambulating. Tolerating regular diet.  Voiding without difficulty. No BM, passing flatus. Breast-feeding.    No plans for birth control.    PHYSICAL EXAM  /63 (BP Location: Right arm, Patient Position: Sitting)   Pulse 88   Temp 97.9 °F (36.6 °C) (Oral)   Resp 16   Wt 86.2 kg (190 lb)   LMP 2020 (Approximate)   SpO2 98%   Breastfeeding Yes   BMI 32.61 kg/m²   General: AAOx3, no apparent distress.  Lungs: CTA B/L anteriorly, no wheezes, rales, rhonchi.  CV: Acceptable rate, regular rhythm, S1/S2 normal.  Abdomen: +BS, soft, nondistended, uterine fundus firm, nontender, and below umbilicus.  Lower extremities: No bilateral edema.  2+/4+ dorsalis pedis pulses B/L.      IMPRESSION  Kenia Koch is a 20 y.o.  PPD #1 s/p .    PLAN  1.  Postpartum s/p   - Continue routine postpartum care.  - Diet: Pregnancy/breastfeeding  - Breast-feeding  - Birth control options were discussed and patient decided on none.  - Discharge to home later today or tomorrow.  Follow-up in 2 weeks (telephone visit), 6 weeks with OB/GYN provider.       This document has been electronically signed by Jerri Hayes, Medical Student on 2021 05:59 CDT.    I have seen and evaluated the patient.  I have discussed the case with the medical student. I have reviewed the notes, assessment and plan, and/or procedures performed by the medical student. I concur with the medical student’s documentation.  Patient does  desire to go home today.  Is overall stable and meeting appropriate milestones.        This document has been electronically signed by Vick Gunter DO on March 19, 2021 06:56 CDT

## 2021-03-23 ENCOUNTER — TELEPHONE (OUTPATIENT)
Dept: LACTATION | Facility: HOSPITAL | Age: 21
End: 2021-03-23

## 2021-04-02 ENCOUNTER — OFFICE VISIT (OUTPATIENT)
Dept: OBSTETRICS AND GYNECOLOGY | Facility: CLINIC | Age: 21
End: 2021-04-02

## 2021-04-02 PROBLEM — Z3A.41 POST TERM PREGNANCY, 41 WEEKS: Status: RESOLVED | Noted: 2021-03-18 | Resolved: 2021-04-02

## 2021-04-02 PROBLEM — O48.0 POST TERM PREGNANCY, 41 WEEKS: Status: RESOLVED | Noted: 2021-03-18 | Resolved: 2021-04-02

## 2021-04-02 PROCEDURE — 0503F POSTPARTUM CARE VISIT: CPT | Performed by: OBSTETRICS & GYNECOLOGY

## 2021-04-02 NOTE — PROGRESS NOTES
Postpartum visit      Kenia Koch is a 20 y.o.  s/p Vaginal, Spontaneous on 3/18 at 41w1d secondary to IOL for late term, delivery complicated by shoulder dystocia, who presents today for postpartum check.  The patient states she is doing well.  Patient denies postpartum depression.  Menstrual cycles have not resumed.  Breast and bottlefeeding.  Undecided contraception.  She has resumed sexual intercourse.  Denies bowel or bladder issues.    PHYSICAL EXAM:    LMP 2020 (Approximate)   Postpartum Depression Screening Questionnaire: 0, no treatment indicated.    IMPRESSION/PLAN: Kenia Koch is a 20 y.o.  s/p Vaginal, Spontaneous on 3/18.  Doing well.  - Recovered nicely from her delivery  - Contraception: Undecided; had issues with prior medication she was on desogestrel-ethinyl estradiol (Shagufta), will discuss further at PP visit, advised pelvic rest in the meantime  - RTC 4 weeks for final PP visit    This document has been electronically signed by Jerri Soler MD on 2021 10:12 CDT.    This visit has been rescheduled as a phone visit to comply with patient safety concerns in accordance with CDC recommendations.  Total time of discussion was 8 minutes.  The use of a telephone visit has been reviewed with the patient and verbal informed consent has been obtained.

## 2021-09-08 ENCOUNTER — LAB (OUTPATIENT)
Dept: LAB | Facility: HOSPITAL | Age: 21
End: 2021-09-08

## 2021-09-08 ENCOUNTER — INITIAL PRENATAL (OUTPATIENT)
Dept: OBSTETRICS AND GYNECOLOGY | Facility: CLINIC | Age: 21
End: 2021-09-08

## 2021-09-08 VITALS — WEIGHT: 156 LBS | BODY MASS INDEX: 26.78 KG/M2

## 2021-09-08 DIAGNOSIS — Z34.80 SUPERVISION OF OTHER NORMAL PREGNANCY: Primary | ICD-10-CM

## 2021-09-08 DIAGNOSIS — Z32.00 PREGNANCY EXAMINATION OR TEST, PREGNANCY UNCONFIRMED: ICD-10-CM

## 2021-09-08 DIAGNOSIS — Z78.9 DATE OF LAST MENSTRUAL PERIOD (LMP) UNKNOWN: ICD-10-CM

## 2021-09-08 DIAGNOSIS — Z32.01 POSITIVE PREGNANCY TEST: ICD-10-CM

## 2021-09-08 DIAGNOSIS — O36.80X0 ENCOUNTER TO DETERMINE FETAL VIABILITY OF PREGNANCY, SINGLE OR UNSPECIFIED FETUS: ICD-10-CM

## 2021-09-08 LAB
ABO GROUP BLD: NORMAL
AMPHET+METHAMPHET UR QL: NEGATIVE
AMPHETAMINES UR QL: NEGATIVE
B-HCG UR QL: POSITIVE
BARBITURATES UR QL SCN: NEGATIVE
BASOPHILS # BLD AUTO: 0.05 10*3/MM3 (ref 0–0.2)
BASOPHILS NFR BLD AUTO: 0.6 % (ref 0–1.5)
BENZODIAZ UR QL SCN: NEGATIVE
BILIRUB UR QL STRIP: NEGATIVE
BLD GP AB SCN SERPL QL: NEGATIVE
BUPRENORPHINE SERPL-MCNC: NEGATIVE NG/ML
CANNABINOIDS SERPL QL: NEGATIVE
CLARITY UR: CLEAR
COCAINE UR QL: NEGATIVE
COLOR UR: YELLOW
DEPRECATED RDW RBC AUTO: 41.6 FL (ref 37–54)
EOSINOPHIL # BLD AUTO: 0.41 10*3/MM3 (ref 0–0.4)
EOSINOPHIL NFR BLD AUTO: 4.6 % (ref 0.3–6.2)
ERYTHROCYTE [DISTWIDTH] IN BLOOD BY AUTOMATED COUNT: 14.4 % (ref 12.3–15.4)
GLUCOSE UR STRIP-MCNC: NEGATIVE MG/DL
HBV SURFACE AG SERPL QL IA: NORMAL
HCG INTACT+B SERPL-ACNC: NORMAL MIU/ML
HCT VFR BLD AUTO: 37.6 % (ref 34–46.6)
HCV AB SER DONR QL: NORMAL
HGB BLD-MCNC: 12.3 G/DL (ref 12–15.9)
HGB UR QL STRIP.AUTO: NEGATIVE
HIV1+2 AB SER QL: NORMAL
HOLD SPECIMEN: NORMAL
IMM GRANULOCYTES # BLD AUTO: 0.03 10*3/MM3 (ref 0–0.05)
IMM GRANULOCYTES NFR BLD AUTO: 0.3 % (ref 0–0.5)
INTERNAL NEGATIVE CONTROL: NEGATIVE
INTERNAL POSITIVE CONTROL: POSITIVE
KETONES UR QL STRIP: NEGATIVE
LEUKOCYTE ESTERASE UR QL STRIP.AUTO: ABNORMAL
LYMPHOCYTES # BLD AUTO: 1.67 10*3/MM3 (ref 0.7–3.1)
LYMPHOCYTES NFR BLD AUTO: 18.6 % (ref 19.6–45.3)
Lab: ABNORMAL
Lab: NORMAL
MCH RBC QN AUTO: 26.3 PG (ref 26.6–33)
MCHC RBC AUTO-ENTMCNC: 32.7 G/DL (ref 31.5–35.7)
MCV RBC AUTO: 80.5 FL (ref 79–97)
METHADONE UR QL SCN: NEGATIVE
MONOCYTES # BLD AUTO: 0.47 10*3/MM3 (ref 0.1–0.9)
MONOCYTES NFR BLD AUTO: 5.2 % (ref 5–12)
NEUTROPHILS NFR BLD AUTO: 6.37 10*3/MM3 (ref 1.7–7)
NEUTROPHILS NFR BLD AUTO: 70.7 % (ref 42.7–76)
NITRITE UR QL STRIP: NEGATIVE
NRBC BLD AUTO-RTO: 0 /100 WBC (ref 0–0.2)
OPIATES UR QL: NEGATIVE
OXYCODONE UR QL SCN: NEGATIVE
PCP UR QL SCN: NEGATIVE
PH UR STRIP.AUTO: 7.5 [PH] (ref 5–8)
PLATELET # BLD AUTO: 233 10*3/MM3 (ref 140–450)
PMV BLD AUTO: 11.6 FL (ref 6–12)
PROPOXYPH UR QL: NEGATIVE
PROT UR QL STRIP: ABNORMAL
RBC # BLD AUTO: 4.67 10*6/MM3 (ref 3.77–5.28)
RH BLD: POSITIVE
SP GR UR STRIP: 1.03 (ref 1–1.03)
TRICYCLICS UR QL SCN: NEGATIVE
UROBILINOGEN UR QL STRIP: ABNORMAL
WBC # BLD AUTO: 9 10*3/MM3 (ref 3.4–10.8)

## 2021-09-08 PROCEDURE — 86900 BLOOD TYPING SEROLOGIC ABO: CPT | Performed by: OBSTETRICS & GYNECOLOGY

## 2021-09-08 PROCEDURE — 81003 URINALYSIS AUTO W/O SCOPE: CPT | Performed by: OBSTETRICS & GYNECOLOGY

## 2021-09-08 PROCEDURE — 87086 URINE CULTURE/COLONY COUNT: CPT | Performed by: OBSTETRICS & GYNECOLOGY

## 2021-09-08 PROCEDURE — 80081 OBSTETRIC PANEL INC HIV TSTG: CPT | Performed by: OBSTETRICS & GYNECOLOGY

## 2021-09-08 PROCEDURE — 86803 HEPATITIS C AB TEST: CPT | Performed by: OBSTETRICS & GYNECOLOGY

## 2021-09-08 PROCEDURE — 87491 CHLMYD TRACH DNA AMP PROBE: CPT | Performed by: OBSTETRICS & GYNECOLOGY

## 2021-09-08 PROCEDURE — 86850 RBC ANTIBODY SCREEN: CPT | Performed by: OBSTETRICS & GYNECOLOGY

## 2021-09-08 PROCEDURE — 81025 URINE PREGNANCY TEST: CPT | Performed by: OBSTETRICS & GYNECOLOGY

## 2021-09-08 PROCEDURE — 86901 BLOOD TYPING SEROLOGIC RH(D): CPT | Performed by: OBSTETRICS & GYNECOLOGY

## 2021-09-08 PROCEDURE — 36415 COLL VENOUS BLD VENIPUNCTURE: CPT

## 2021-09-08 PROCEDURE — 84702 CHORIONIC GONADOTROPIN TEST: CPT | Performed by: OBSTETRICS & GYNECOLOGY

## 2021-09-08 PROCEDURE — 87661 TRICHOMONAS VAGINALIS AMPLIF: CPT | Performed by: OBSTETRICS & GYNECOLOGY

## 2021-09-08 PROCEDURE — 80306 DRUG TEST PRSMV INSTRMNT: CPT | Performed by: OBSTETRICS & GYNECOLOGY

## 2021-09-08 PROCEDURE — 86762 RUBELLA ANTIBODY: CPT | Performed by: OBSTETRICS & GYNECOLOGY

## 2021-09-08 PROCEDURE — 87591 N.GONORRHOEAE DNA AMP PROB: CPT | Performed by: OBSTETRICS & GYNECOLOGY

## 2021-09-08 NOTE — PROGRESS NOTES
I spent approximately 60 minutes with the patient acquiring the health and history intake and discussing topics related to healthy lifestyle. She had a positive home pregnancy test about 3 weeks ago. Her UPT in office today is positive.  Her LMP is unknown. She delivered vaginally with her daughter in March. She says she  her for a couple of months, but she has not had a period since the delivery. She has not been on any birth control either.  This is her 2nd pregnancy.   She denies any health problems. She denies any alcohol, tobacco, or drug use. She denies any depression or anxiety. She filled out the depression screening questionnaire and scored 0.   A newob bag is given. The 1st trimester teaching was done with the patient. We discussed a healthy diet and exercise and what is recommended. She plans to  prenatal vitamins OTC.  I also discussed Listeriosis and Toxoplasmosis and what fish to avoid due to high mercury levels. I informed patient not to be in hot tubs, saunas, or tanning beds. We discussed that spotting may occur after intercourse which is common, but if heavy bleeding like a period occurs to call [facility] or hospital if clinic is closed.  I encouraged her to make an appointment with the dentist if she has not had a dental exam and cleaning in the last 6 months.  She plans to breastfeed this baby also.   I gave her pamphlet on breastfeeding classes and the breastfeeding mothers support group. These services are provided by Stephanie Rodas, Lactation Consultant. I encouraged the patient to get the TDAP vaccine in the 3rd trimester.  I discussed with the patient that a pediatrician needs to be chosen prior to delivery for the infant to have an appointment scheduled before leaving the hospital. Her daughter sees a provider in [place]. She plans to continue with her.   I discussed lab tests will be done today. Her last pap smear was 7/24/20 and was negative. All questions were  answered at this time. She is given an ultrasound appointment and to see Carrol PEREIRA on 9/22/21. Depending on her beta HCG quant level, this appointment may have to be changed. The patient is aware of this, and I will call her with results.

## 2021-09-09 LAB
BACTERIA SPEC AEROBE CULT: NO GROWTH
C TRACH RRNA CVX QL NAA+PROBE: NEGATIVE
N GONORRHOEA RRNA SPEC QL NAA+PROBE: NEGATIVE
RPR SER QL: NORMAL
RUBV IGG SERPL IA-ACNC: 1.86 INDEX
TRICHOMONAS VAGINALIS PCR: NEGATIVE

## 2021-09-10 ENCOUNTER — TELEPHONE (OUTPATIENT)
Dept: OBSTETRICS AND GYNECOLOGY | Facility: CLINIC | Age: 21
End: 2021-09-10

## 2021-09-10 NOTE — TELEPHONE ENCOUNTER
PATIENT CALLED WANTING HER HCG QUANT RESULTS. SHE WANTED TO KNOW HOW FAR ALONG IN HER PREGNANCY SHE WAS. I TOLD HER THAT I COULDN'T TELL BY THE NUMBERS BUT MAYBE SOMEWHERE BETWEEN 7 - 12 WEEKS. I TOLD HER SHE WOULD NEED TO DO AN ULTRASOUND TO KNOW THAT FOR SURE. PATIENT VERBALIZED UNDERSTANDING.

## 2021-09-22 ENCOUNTER — INITIAL PRENATAL (OUTPATIENT)
Dept: OBSTETRICS AND GYNECOLOGY | Facility: CLINIC | Age: 21
End: 2021-09-22

## 2021-09-22 VITALS — DIASTOLIC BLOOD PRESSURE: 64 MMHG | WEIGHT: 156 LBS | SYSTOLIC BLOOD PRESSURE: 112 MMHG | BODY MASS INDEX: 26.78 KG/M2

## 2021-09-22 DIAGNOSIS — Z3A.12 12 WEEKS GESTATION OF PREGNANCY: Primary | ICD-10-CM

## 2021-09-22 DIAGNOSIS — Z34.81 MULTIGRAVIDA IN FIRST TRIMESTER: ICD-10-CM

## 2021-09-22 PROBLEM — Z34.90 SUPERVISION OF NORMAL PREGNANCY: Status: RESOLVED | Noted: 2020-08-27 | Resolved: 2021-09-22

## 2021-09-22 PROCEDURE — 0501F PRENATAL FLOW SHEET: CPT | Performed by: NURSE PRACTITIONER

## 2021-09-22 NOTE — PROGRESS NOTES
[facility]  Obstetrics Visit    CHIEF COMPLAINT:  New prenatal visit    HISTORY OF PRESENT ILLNESS:  Kenia Koch is a 20 y.o. y/o  at 12w3d by LMP (No LMP recorded (lmp unknown). Patient is pregnant. This was not a planned pregnancy, but the patient and her spouse are excited. She denies any vaginal bleeding.  She has started taking a prenatal vitamin.    REVIEW OF SYSTEMS  Review of Systems   Constitutional: Negative for activity change, appetite change, chills, diaphoresis, fatigue, fever, unexpected weight gain and unexpected weight loss.   Respiratory: Negative for apnea, chest tightness and shortness of breath.    Cardiovascular: Negative for chest pain, palpitations and leg swelling.   Gastrointestinal: Negative for abdominal distention, abdominal pain, constipation and diarrhea.   Genitourinary: Negative for amenorrhea, breast discharge, breast lump, breast pain, decreased libido, decreased urine volume, difficulty urinating, dyspareunia, dysuria, flank pain, frequency, genital sores, hematuria, pelvic pain, pelvic pressure, urgency, urinary incontinence, vaginal bleeding, vaginal discharge and vaginal pain.   Musculoskeletal: Negative for myalgias.   Skin: Negative for color change, dry skin and skin lesions.   Neurological: Negative for light-headedness and headache.   Psychiatric/Behavioral: Negative for agitation, dysphoric mood, sleep disturbance, suicidal ideas, depressed mood and stress. The patient is not nervous/anxious.        PRENATAL RISK FACTORS   Problems (from 21 to present)     No problems associated with this episode.          DATING CRITERIA:  LMP unknown  1TUS (2021 at 12w3d) -- YUMI 2022    OBSTETRIC HISTORY:  OB History    Para Term  AB Living   2 1 1 0 0 1   SAB TAB Ectopic Molar Multiple Live Births   0 0 0 0 0 1      # Outcome Date GA Lbr Yordy/2nd Weight Sex Delivery Anes PTL Lv   2 Current            1 Term 21  41w1d 07:07 / 00:19 4020 g (8 lb 13.8 oz) F Vag-Spont None N ARIANA     GYN HISTORY:  Denies h/o sexually transmitted infections/pelvic inflammatory disease  Denies h/o abnormal pap smears  Last pap smear:   Last Completed Pap Smear          PAP SMEAR (Every 3 Years) Next due on 7/24/2023 07/24/2020  Liquid-based Pap Smear, Screening              Denies h/o gynecologic surgeries, including biopsies of the cervix    PAST MEDICAL HISTORY:  No past medical history on file.  PAST SURGICAL HISTORY:  Past Surgical History:   Procedure Laterality Date   • ADENOIDECTOMY     • TONSILLECTOMY     • WISDOM TOOTH EXTRACTION       FAMILY HISTORY:  Family History   Problem Relation Age of Onset   • Hypertension Father    • No Known Problems Mother    • Hypertension Paternal Grandfather    • Heart block Paternal Grandfather    • Arthritis Paternal Grandfather    • ALS Paternal Grandmother    • Hypertension Paternal Grandmother    • No Known Problems Maternal Grandmother    • No Known Problems Half-Brother    • No Known Problems Daughter    • No Known Problems Half-Sister      SOCIAL HISTORY:  Social History     Socioeconomic History   • Marital status:      Spouse name: Not on file   • Number of children: Not on file   • Years of education: Not on file   • Highest education level: Not on file   Tobacco Use   • Smoking status: Never Smoker   • Smokeless tobacco: Never Used   Substance and Sexual Activity   • Alcohol use: No   • Drug use: No   • Sexual activity: Yes     Partners: Male     Comment: pap smear 7/24/20 negative      GENETIC SCREENING:  Age >36 yo as of YUMI: no  Thalassemia: no  NTD: no  CHD: no  Down Syndrome/MR/Fragile X/Autism: no  Ashkenazi Restorationism with Luis-Sachs, [place], familial dysautonomia: no  Sickle cell disease or trait: no  Hemophilia: no  Muscular dystrophy: no  Cystic fibrosis: no  Rosemarie's chorea: no  Birth defects: no  Genetic/chromosomal disorders: no    INFECTION HISTORY:  TB exposure:  no  HSV: no  Illness since LMP: no  Prior GBS infected child: no  STIs: no    ALLERGIES:  No Known Allergies    MEDICATIONS:  Prior to Admission medications    Medication Sig Start Date End Date Taking? Authorizing Provider   PRENATAL GUMMY VITAMIN Chew 1 each Daily.   Yes Provider, Toya, MD       PHYSICAL EXAM:   /64   Wt 70.8 kg (156 lb)   LMP  (LMP Unknown)   BMI 26.78 kg/m²   General: Alert, healthy, no distress, well nourished and well developed.  Neurologic: Alert, oriented to person, place, and time.  Gait normal.  Cranial nerves II-XII grossly intact.  HEENT: Normocephalic, atraumatic.  Extraocular muscles intact, pupils equal and reactive x2.    Teeth: Normal hygiene.  Neck: Supple, no adenopathy, thyroid normal size, non-tender, without nodularity, trachea midline.  Breasts: No masses, skin dimpling, skin retraction, nipple discharge, or asymmetry bilaterally.  Lungs: Normal respiratory effort.  Clear to auscultation bilaterally.  No wheezes, rhonci, or rales.  Heart: Regular rate and rhythm.  No murmer, rub or gallop.  Abdomen: Soft, non-tender, non-distended,no masses, no hepatosplenomegaly, no hernia.  Skin: No rash, no lesions.  Extremities: No cyanosis, clubbing or edema.    Prelim TVUS dating scan- single IUP with CRL 12w3d  bpm, gestational sac wnl, right ovary wnl, left ovary not seen     IMPRESSION:  Kenia Koch is a 20 y.o.  at 12w3d for a new prenatal visit.    PLAN:  1.  IUP at 12w3d  - Options counseling performed and patient desires continuation of pregnancy to term   - Prenatal labs ordered  - Genetic testing, including cystic fibrosis, was discussed and patient declines   - Continue prenatal vitamins  - Weight gain counseling performed.   - Pregravid BMI 25-29.9: Recommend 15-25 lb  - Return to clinic in 4 weeks for return prenatal visit  - Reviewed COVID-19 visitation policy  - Reviewed COVID-19 precautions     Diagnosis Plan   1. 12 weeks gestation of  pregnancy     2. Multigravida in first trimester     3. Shoulder dystocia during labor and delivery       No diagnosis found.  KELLI Pulido  9/22/2021  10:23 CDT

## 2021-10-22 PROBLEM — O09.899 SHORT INTERVAL BETWEEN PREGNANCIES AFFECTING PREGNANCY, ANTEPARTUM: Status: ACTIVE | Noted: 2021-10-22

## 2021-10-22 PROBLEM — L30.9 ECZEMA: Status: RESOLVED | Noted: 2018-12-27 | Resolved: 2021-10-22

## 2021-10-22 PROBLEM — O09.92 SUPERVISION OF HIGH RISK PREGNANCY IN SECOND TRIMESTER: Status: ACTIVE | Noted: 2021-10-22

## 2021-10-28 ENCOUNTER — ROUTINE PRENATAL (OUTPATIENT)
Dept: OBSTETRICS AND GYNECOLOGY | Facility: CLINIC | Age: 21
End: 2021-10-28

## 2021-10-28 VITALS — WEIGHT: 155.6 LBS | DIASTOLIC BLOOD PRESSURE: 66 MMHG | SYSTOLIC BLOOD PRESSURE: 114 MMHG | BODY MASS INDEX: 26.71 KG/M2

## 2021-10-28 DIAGNOSIS — Z3A.17 17 WEEKS GESTATION OF PREGNANCY: ICD-10-CM

## 2021-10-28 DIAGNOSIS — Z36.89 ENCOUNTER FOR FETAL ANATOMIC SURVEY: ICD-10-CM

## 2021-10-28 DIAGNOSIS — O09.899 SHORT INTERVAL BETWEEN PREGNANCIES AFFECTING PREGNANCY, ANTEPARTUM: ICD-10-CM

## 2021-10-28 DIAGNOSIS — O09.92 SUPERVISION OF HIGH RISK PREGNANCY IN SECOND TRIMESTER: Primary | ICD-10-CM

## 2021-10-28 DIAGNOSIS — L30.8 OTHER ECZEMA: ICD-10-CM

## 2021-10-28 PROCEDURE — 0502F SUBSEQUENT PRENATAL CARE: CPT | Performed by: OBSTETRICS & GYNECOLOGY

## 2021-10-28 RX ORDER — TRIAMCINOLONE ACETONIDE 5 MG/G
1 CREAM TOPICAL 2 TIMES DAILY
Qty: 15 G | Refills: 3 | Status: SHIPPED | OUTPATIENT
Start: 2021-10-28 | End: 2022-04-02 | Stop reason: HOSPADM

## 2021-10-28 RX ORDER — PREDNISONE 20 MG/1
TABLET ORAL
Qty: 15 TABLET | Refills: 0 | Status: SHIPPED | OUTPATIENT
Start: 2021-10-28 | End: 2021-11-07

## 2021-10-28 NOTE — PROGRESS NOTES
CC: Prenatal visit    Kenia Koch is a 20 y.o.  at 17w4d.  Doing well.  Denies contractions, LOF, or VB.      /66   Wt 70.6 kg (155 lb 9.6 oz)   LMP  (LMP Unknown)   BMI 26.71 kg/m²   Fetal Heart Rate: 150s     Problems (from 21 to present)     Problem Noted Resolved    Short interval between pregnancies affecting pregnancy, antepartum 10/22/2021 by Jerri Soler MD No    Supervision of high risk pregnancy in second trimester 10/22/2021 by Jerri Soler MD No    Shoulder dystocia during labor and delivery 3/18/2021 by Jerri Soler MD No    Overview Signed 3/18/2021  2:55 PM by Jerri Soler MD     McRobert's and suprapubic pressure             A/P: Kenia Koch is a 20 y.o.  at 17w4d.  - RTC in 2-3 weeks w/ anatomy US  - Reviewed COVID-19 visitation policy  - Reviewed COVID-19 precautions     Diagnosis Plan   1. Supervision of high risk pregnancy in second trimester     2. Short interval between pregnancies affecting pregnancy, antepartum     3. Shoulder dystocia during labor and delivery     4. Encounter for fetal anatomic survey  US Ob 14 + Weeks Single or First Gestation   5. 17 weeks gestation of pregnancy     6. Other eczema  triamcinolone (KENALOG) 0.5 % cream    predniSONE (DELTASONE) 20 MG tablet     Jerri Soler MD  10/28/2021  08:59 CDT

## 2021-11-11 ENCOUNTER — ROUTINE PRENATAL (OUTPATIENT)
Dept: OBSTETRICS AND GYNECOLOGY | Facility: CLINIC | Age: 21
End: 2021-11-11

## 2021-11-11 VITALS — SYSTOLIC BLOOD PRESSURE: 110 MMHG | BODY MASS INDEX: 25.61 KG/M2 | DIASTOLIC BLOOD PRESSURE: 66 MMHG | WEIGHT: 149.2 LBS

## 2021-11-11 DIAGNOSIS — Z3A.19 19 WEEKS GESTATION OF PREGNANCY: ICD-10-CM

## 2021-11-11 DIAGNOSIS — O09.899 SHORT INTERVAL BETWEEN PREGNANCIES AFFECTING PREGNANCY, ANTEPARTUM: ICD-10-CM

## 2021-11-11 DIAGNOSIS — O09.92 SUPERVISION OF HIGH RISK PREGNANCY IN SECOND TRIMESTER: Primary | ICD-10-CM

## 2021-11-11 PROCEDURE — 0502F SUBSEQUENT PRENATAL CARE: CPT | Performed by: OBSTETRICS & GYNECOLOGY

## 2021-11-11 NOTE — PROGRESS NOTES
CC: Prenatal visit    Kenia Koch is a 20 y.o.  at 19w4d.  Doing well.  Denies contractions, LOF, or VB.  Reports good FM.    /66   Wt 67.7 kg (149 lb 3.2 oz)   LMP  (LMP Unknown)   BMI 25.61 kg/m²   Fetal Heart Rate: 157    Reviewed anatomy US- EFW 298g (53%ile) w/ AC 44%ile, placenta posterior w/o previa, anatomy WNL     Problems (from 21 to present)     Problem Noted Resolved    Short interval between pregnancies affecting pregnancy, antepartum 10/22/2021 by Jerri Soler MD No    Supervision of high risk pregnancy in second trimester 10/22/2021 by Jerri Soler MD No    Shoulder dystocia during labor and delivery 3/18/2021 by Jerri Soler MD No    Overview Signed 3/18/2021  2:55 PM by Jerri Soler MD     McRobert's and suprapubic pressure             A/P: Kenia Koch is a 20 y.o.  at 19w4d.  - RTC in 4 weeks w/ Fiatt  - Reviewed COVID-19 visitation policy  - Reviewed COVID-19 precautions     Diagnosis Plan   1. Supervision of high risk pregnancy in second trimester     2. Short interval between pregnancies affecting pregnancy, antepartum     3. Shoulder dystocia during labor and delivery     4. 19 weeks gestation of pregnancy       Jerri Soler MD  2021  10:28 CST

## 2021-12-08 ENCOUNTER — ROUTINE PRENATAL (OUTPATIENT)
Dept: OBSTETRICS AND GYNECOLOGY | Facility: CLINIC | Age: 21
End: 2021-12-08

## 2021-12-08 VITALS — BODY MASS INDEX: 28.63 KG/M2 | SYSTOLIC BLOOD PRESSURE: 108 MMHG | DIASTOLIC BLOOD PRESSURE: 62 MMHG | WEIGHT: 166.8 LBS

## 2021-12-08 DIAGNOSIS — O09.899 SHORT INTERVAL BETWEEN PREGNANCIES AFFECTING PREGNANCY, ANTEPARTUM: ICD-10-CM

## 2021-12-08 DIAGNOSIS — Z3A.23 23 WEEKS GESTATION OF PREGNANCY: ICD-10-CM

## 2021-12-08 DIAGNOSIS — O09.92 SUPERVISION OF HIGH RISK PREGNANCY IN SECOND TRIMESTER: Primary | ICD-10-CM

## 2021-12-08 PROCEDURE — 0502F SUBSEQUENT PRENATAL CARE: CPT | Performed by: NURSE PRACTITIONER

## 2021-12-08 NOTE — PROGRESS NOTES
CC: Prenatal visit    Kenia Koch is a 20 y.o.  at 23w3d.  Doing well.  Denies contractions, LOF, or VB.  Reports good FM.    /62   Wt 75.7 kg (166 lb 12.8 oz)   LMP  (LMP Unknown)   BMI 28.63 kg/m²   Fundal Height (cm): 23 cm  Fetal Heart Rate: 148     Problems (from 21 to present)     Problem Noted Resolved    Short interval between pregnancies affecting pregnancy, antepartum 10/22/2021 by Jerri Soler MD No    Supervision of high risk pregnancy in second trimester 10/22/2021 by Jerri Soler MD No    Shoulder dystocia during labor and delivery 3/18/2021 by Jerri Soler MD No    Overview Signed 3/18/2021  2:55 PM by Jerri Soler MD     McRobert's and suprapubic pressure               A/P: Kenia Koch is a 20 y.o.  at 23w3d.  - RTC in 4 weeks for CBC, 1hr Glucola, TDAP, breast pump script   - Reviewed COVID-19 visitation policy  - Reviewed COVID-19 precautions     Diagnosis Plan   1. Supervision of high risk pregnancy in second trimester     2. 23 weeks gestation of pregnancy     3. Short interval between pregnancies affecting pregnancy, antepartum     4. Shoulder dystocia during labor and delivery       Naomi Santiago, APRNICHOLE  2021  09:04 CST

## 2022-01-05 ENCOUNTER — ROUTINE PRENATAL (OUTPATIENT)
Dept: OBSTETRICS AND GYNECOLOGY | Facility: CLINIC | Age: 22
End: 2022-01-05

## 2022-01-05 ENCOUNTER — LAB (OUTPATIENT)
Dept: LAB | Facility: OTHER | Age: 22
End: 2022-01-05

## 2022-01-05 VITALS — BODY MASS INDEX: 29.49 KG/M2 | DIASTOLIC BLOOD PRESSURE: 60 MMHG | WEIGHT: 171.8 LBS | SYSTOLIC BLOOD PRESSURE: 116 MMHG

## 2022-01-05 DIAGNOSIS — L30.9 ECZEMA OF LOWER LEG: ICD-10-CM

## 2022-01-05 DIAGNOSIS — O09.92 SUPERVISION OF HIGH RISK PREGNANCY IN SECOND TRIMESTER: ICD-10-CM

## 2022-01-05 DIAGNOSIS — O99.019 IRON DEFICIENCY ANEMIA DURING PREGNANCY: ICD-10-CM

## 2022-01-05 DIAGNOSIS — Z23 NEED FOR TDAP VACCINATION: ICD-10-CM

## 2022-01-05 DIAGNOSIS — O09.899 SHORT INTERVAL BETWEEN PREGNANCIES AFFECTING PREGNANCY, ANTEPARTUM: ICD-10-CM

## 2022-01-05 DIAGNOSIS — O09.92 SUPERVISION OF HIGH RISK PREGNANCY IN SECOND TRIMESTER: Primary | ICD-10-CM

## 2022-01-05 DIAGNOSIS — D50.9 IRON DEFICIENCY ANEMIA DURING PREGNANCY: ICD-10-CM

## 2022-01-05 LAB
DEPRECATED RDW RBC AUTO: 37.5 FL (ref 37–54)
ERYTHROCYTE [DISTWIDTH] IN BLOOD BY AUTOMATED COUNT: 13.9 % (ref 12.3–15.4)
GLUCOSE 1H P 100 G GLC PO SERPL-MCNC: 125 MG/DL (ref 70–100)
HCT VFR BLD AUTO: 32.9 % (ref 34–46.6)
HGB BLD-MCNC: 10.3 G/DL (ref 12–15.9)
MCH RBC QN AUTO: 24 PG (ref 26.6–33)
MCHC RBC AUTO-ENTMCNC: 31.3 G/DL (ref 31.5–35.7)
MCV RBC AUTO: 76.5 FL (ref 79–97)
PLATELET # BLD AUTO: 265 10*3/MM3 (ref 140–450)
PMV BLD AUTO: 9.3 FL (ref 6–12)
RBC # BLD AUTO: 4.3 10*6/MM3 (ref 3.77–5.28)
WBC NRBC COR # BLD: 14.17 10*3/MM3 (ref 3.4–10.8)

## 2022-01-05 PROCEDURE — 0502F SUBSEQUENT PRENATAL CARE: CPT | Performed by: NURSE PRACTITIONER

## 2022-01-05 PROCEDURE — 85027 COMPLETE CBC AUTOMATED: CPT | Performed by: NURSE PRACTITIONER

## 2022-01-05 PROCEDURE — 90471 IMMUNIZATION ADMIN: CPT | Performed by: NURSE PRACTITIONER

## 2022-01-05 PROCEDURE — 36415 COLL VENOUS BLD VENIPUNCTURE: CPT | Performed by: NURSE PRACTITIONER

## 2022-01-05 PROCEDURE — 90715 TDAP VACCINE 7 YRS/> IM: CPT | Performed by: NURSE PRACTITIONER

## 2022-01-05 PROCEDURE — 82950 GLUCOSE TEST: CPT | Performed by: NURSE PRACTITIONER

## 2022-01-05 RX ORDER — FERROUS SULFATE 325(65) MG
325 TABLET ORAL 2 TIMES DAILY
Qty: 60 TABLET | Refills: 2 | Status: SHIPPED | OUTPATIENT
Start: 2022-01-05 | End: 2022-09-21

## 2022-01-05 NOTE — PROGRESS NOTES
CC: Prenatal visit    Kenia Koch is a 21 y.o.  at 27w3d.  Doing well.  Denies contractions, LOF, or VB.  Reports good FM. Notes an increase in her eczema in the last month. She was previously given triamcinolone ointment by Dr. Soler and used it quickly. She didn't know it had refills and hasn't used it in a while. But it did help when she was using it. Emollients are not improving. She takes benadryl at bedtime with some relief. Encouraged her to  refill on steroid ointment. If worsening, she will contact the office for further management PRN.     /60   Wt 77.9 kg (171 lb 12.8 oz)   LMP  (LMP Unknown)   BMI 29.49 kg/m²     Fundal Height (cm): 27 cm  Fetal Heart Rate: 140     Problems (from 21 to present)     Problem Noted Resolved    Iron deficiency anemia during pregnancy 2022 by Naomi Santiago, KELLI No    Short interval between pregnancies affecting pregnancy, antepartum 10/22/2021 by Jerri Soler MD No    Supervision of high risk pregnancy in second trimester 10/22/2021 by Jerri Soler MD No    Overview Signed 2022  4:43 PM by Naomi Santiago, KELLI     WBC   Date Value Ref Range Status   2022 14.17 (H) 3.40 - 10.80 10*3/mm3 Final     RBC   Date Value Ref Range Status   2022 4.30 3.77 - 5.28 10*6/mm3 Final     Hemoglobin   Date Value Ref Range Status   2022 10.3 (L) 12.0 - 15.9 g/dL Final     Hematocrit   Date Value Ref Range Status   2022 32.9 (L) 34.0 - 46.6 % Final     MCV   Date Value Ref Range Status   2022 76.5 (L) 79.0 - 97.0 fL Final     MCH   Date Value Ref Range Status   2022 24.0 (L) 26.6 - 33.0 pg Final     MCHC   Date Value Ref Range Status   2022 31.3 (L) 31.5 - 35.7 g/dL Final     RDW   Date Value Ref Range Status   2022 13.9 12.3 - 15.4 % Final     RDW-SD   Date Value Ref Range Status   2022 37.5 37.0 - 54.0 fl Final     MPV   Date  Value Ref Range Status   2022 9.3 6.0 - 12.0 fL Final     Platelets   Date Value Ref Range Status   2022 265 140 - 450 10*3/mm3 Final     Neutrophil %   Date Value Ref Range Status   2021 70.7 42.7 - 76.0 % Final     Lymphocyte %   Date Value Ref Range Status   2021 18.6 (L) 19.6 - 45.3 % Final     Monocyte %   Date Value Ref Range Status   2021 5.2 5.0 - 12.0 % Final     Eosinophil %   Date Value Ref Range Status   2021 4.6 0.3 - 6.2 % Final     Basophil %   Date Value Ref Range Status   2021 0.6 0.0 - 1.5 % Final     Immature Grans %   Date Value Ref Range Status   2021 0.3 0.0 - 0.5 % Final     Neutrophils, Absolute   Date Value Ref Range Status   2021 6.37 1.70 - 7.00 10*3/mm3 Final     Lymphocytes, Absolute   Date Value Ref Range Status   2021 1.67 0.70 - 3.10 10*3/mm3 Final     Monocytes, Absolute   Date Value Ref Range Status   2021 0.47 0.10 - 0.90 10*3/mm3 Final     Eosinophils, Absolute   Date Value Ref Range Status   2021 0.41 (H) 0.00 - 0.40 10*3/mm3 Final     Basophils, Absolute   Date Value Ref Range Status   2021 0.05 0.00 - 0.20 10*3/mm3 Final     Immature Grans, Absolute   Date Value Ref Range Status   2021 0.03 0.00 - 0.05 10*3/mm3 Final     nRBC   Date Value Ref Range Status   2021 0.0 0.0 - 0.2 /100 WBC Final            Shoulder dystocia during labor and delivery 3/18/2021 by Jerri Soler MD No    Overview Signed 3/18/2021  2:55 PM by Jerri Soler MD     Mc[employer]'s and suprapubic pressure               A/P: Kenia Alejandra Zee is a 21 y.o.  at 27w3d.  - RTC in 3 weeks for 30 week appt.   -1H glucose 125  -Anemia in pregnancy. Begin Ferrous sulfate 325mg BID.   -TDAP administered today. Declines flu vaccine today.   - Refill Triamcinolone at the pharmacy and use that as prescribed. Encouraged continued emollient use. Aquaphor recommended. Call if symptoms are  progressing.   - Reviewed COVID-19 visitation policy  - Reviewed COVID-19 precautions     Diagnosis Plan   1. Supervision of high risk pregnancy in second trimester     2. Short interval between pregnancies affecting pregnancy, antepartum     3. Shoulder dystocia during labor and delivery     4. Eczema of lower leg     5. Need for Tdap vaccination  Tdap Vaccine Greater Than or Equal To 8yo IM   6. Iron deficiency anemia during pregnancy       Naomi Santiago, APRN  1/5/2022  16:46 CST

## 2022-01-10 ENCOUNTER — TELEPHONE (OUTPATIENT)
Dept: OBSTETRICS AND GYNECOLOGY | Facility: CLINIC | Age: 22
End: 2022-01-10

## 2022-01-10 NOTE — TELEPHONE ENCOUNTER
Patient called to advise she tested positive for Covid. She said she just wanted to let us know. Patient can be reached at #[phone] if you need to contact her.     Thanks,  Ama

## 2022-01-12 NOTE — TELEPHONE ENCOUNTER
Called and spoke with patient regarding results. Let her know that in regards to covid she should monitor her symptoms and contact her pcp and we can comanage her care with them if needed, and to stay hydrated and rest as needed.    Patient verbalized understanding

## 2022-01-26 ENCOUNTER — ROUTINE PRENATAL (OUTPATIENT)
Dept: OBSTETRICS AND GYNECOLOGY | Facility: CLINIC | Age: 22
End: 2022-01-26

## 2022-01-26 VITALS — DIASTOLIC BLOOD PRESSURE: 62 MMHG | WEIGHT: 176.2 LBS | SYSTOLIC BLOOD PRESSURE: 112 MMHG | BODY MASS INDEX: 30.24 KG/M2

## 2022-01-26 DIAGNOSIS — O99.019 IRON DEFICIENCY ANEMIA DURING PREGNANCY: ICD-10-CM

## 2022-01-26 DIAGNOSIS — O09.899 SHORT INTERVAL BETWEEN PREGNANCIES AFFECTING PREGNANCY, ANTEPARTUM: ICD-10-CM

## 2022-01-26 DIAGNOSIS — O09.93 SUPERVISION OF HIGH RISK PREGNANCY IN THIRD TRIMESTER: ICD-10-CM

## 2022-01-26 DIAGNOSIS — D50.9 IRON DEFICIENCY ANEMIA DURING PREGNANCY: ICD-10-CM

## 2022-01-26 PROCEDURE — 0502F SUBSEQUENT PRENATAL CARE: CPT | Performed by: NURSE PRACTITIONER

## 2022-01-26 NOTE — PROGRESS NOTES
CC: Prenatal visit    Kenia Koch is a 21 y.o.  at 30w3d.  Doing well.  Denies contractions, LOF, or VB.  Reports good FM.    Pt is doing much better with eczema since using topical triamcinolone. Denies concerns with this today.     Is taking Fe supplement without concern.     Recent COVID infection on 1/10. Had only mild symptoms and recovered well.     /62   Wt 79.9 kg (176 lb 3.2 oz)   LMP  (LMP Unknown)   BMI 30.24 kg/m²     Fundal Height (cm): 29 cm  Fetal Heart Rate: 145     Problems (from 21 to present)     Problem Noted Resolved    Iron deficiency anemia during pregnancy 2022 by Naomi Santiago, KELLI No    Short interval between pregnancies affecting pregnancy, antepartum 10/22/2021 by Jerri Soler MD No    Supervision of high risk pregnancy in third trimester 10/22/2021 by Jerri Soler MD No    Overview Signed 2022  4:43 PM by Naomi Santiago, KELLI     WBC   Date Value Ref Range Status   2022 14.17 (H) 3.40 - 10.80 10*3/mm3 Final     RBC   Date Value Ref Range Status   2022 4.30 3.77 - 5.28 10*6/mm3 Final     Hemoglobin   Date Value Ref Range Status   2022 10.3 (L) 12.0 - 15.9 g/dL Final     Hematocrit   Date Value Ref Range Status   2022 32.9 (L) 34.0 - 46.6 % Final     MCV   Date Value Ref Range Status   2022 76.5 (L) 79.0 - 97.0 fL Final     MCH   Date Value Ref Range Status   2022 24.0 (L) 26.6 - 33.0 pg Final     MCHC   Date Value Ref Range Status   2022 31.3 (L) 31.5 - 35.7 g/dL Final     RDW   Date Value Ref Range Status   2022 13.9 12.3 - 15.4 % Final     RDW-SD   Date Value Ref Range Status   2022 37.5 37.0 - 54.0 fl Final     MPV   Date Value Ref Range Status   2022 9.3 6.0 - 12.0 fL Final     Platelets   Date Value Ref Range Status   2022 265 140 - 450 10*3/mm3 Final     Neutrophil %   Date Value Ref Range Status   2021  70.7 42.7 - 76.0 % Final     Lymphocyte %   Date Value Ref Range Status   2021 18.6 (L) 19.6 - 45.3 % Final     Monocyte %   Date Value Ref Range Status   2021 5.2 5.0 - 12.0 % Final     Eosinophil %   Date Value Ref Range Status   2021 4.6 0.3 - 6.2 % Final     Basophil %   Date Value Ref Range Status   2021 0.6 0.0 - 1.5 % Final     Immature Grans %   Date Value Ref Range Status   2021 0.3 0.0 - 0.5 % Final     Neutrophils, Absolute   Date Value Ref Range Status   2021 6.37 1.70 - 7.00 10*3/mm3 Final     Lymphocytes, Absolute   Date Value Ref Range Status   2021 1.67 0.70 - 3.10 10*3/mm3 Final     Monocytes, Absolute   Date Value Ref Range Status   2021 0.47 0.10 - 0.90 10*3/mm3 Final     Eosinophils, Absolute   Date Value Ref Range Status   2021 0.41 (H) 0.00 - 0.40 10*3/mm3 Final     Basophils, Absolute   Date Value Ref Range Status   2021 0.05 0.00 - 0.20 10*3/mm3 Final     Immature Grans, Absolute   Date Value Ref Range Status   2021 0.03 0.00 - 0.05 10*3/mm3 Final     nRBC   Date Value Ref Range Status   2021 0.0 0.0 - 0.2 /100 WBC Final            Shoulder dystocia during labor and delivery 3/18/2021 by Jerri Soler MD No    Overview Signed 3/18/2021  2:55 PM by Jerri Soler MD     Mc[employer]'s and suprapubic pressure               A/P: Kenia Koch is a 21 y.o.  at 30w3d.  - RTC in 2 weeks  - Reviewed COVID-19 visitation policy  - Reviewed COVID-19 precautions     Diagnosis Plan   1. Iron deficiency anemia during pregnancy     2. Short interval between pregnancies affecting pregnancy, antepartum     3. Supervision of high risk pregnancy in third trimester     4. Shoulder dystocia during labor and delivery       Naomi Santiago, APRN  2022  12:37 CST

## 2022-02-11 ENCOUNTER — ROUTINE PRENATAL (OUTPATIENT)
Dept: OBSTETRICS AND GYNECOLOGY | Facility: CLINIC | Age: 22
End: 2022-02-11

## 2022-02-11 VITALS — BODY MASS INDEX: 31.07 KG/M2 | DIASTOLIC BLOOD PRESSURE: 60 MMHG | SYSTOLIC BLOOD PRESSURE: 108 MMHG | WEIGHT: 181 LBS

## 2022-02-11 DIAGNOSIS — D50.9 IRON DEFICIENCY ANEMIA DURING PREGNANCY: ICD-10-CM

## 2022-02-11 DIAGNOSIS — O09.899 SHORT INTERVAL BETWEEN PREGNANCIES AFFECTING PREGNANCY, ANTEPARTUM: ICD-10-CM

## 2022-02-11 DIAGNOSIS — O09.93 SUPERVISION OF HIGH RISK PREGNANCY IN THIRD TRIMESTER: Primary | ICD-10-CM

## 2022-02-11 DIAGNOSIS — O99.019 IRON DEFICIENCY ANEMIA DURING PREGNANCY: ICD-10-CM

## 2022-02-11 PROCEDURE — 0502F SUBSEQUENT PRENATAL CARE: CPT | Performed by: NURSE PRACTITIONER

## 2022-02-11 NOTE — PROGRESS NOTES
CC: Prenatal visit    Kenia Koch is a 21 y.o.  at 32w5d.  Doing well.  Denies contractions, LOF, or VB.  Reports good FM.    Continues on Iron supplement without concern. Continues on triamcinolone for eczema with good control     /60   Wt 82.1 kg (181 lb)   LMP  (LMP Unknown)   BMI 31.07 kg/m²     Fundal Height (cm): 31 cm  Fetal Heart Rate: 135     Problems (from 21 to present)     Problem Noted Resolved    Iron deficiency anemia during pregnancy 2022 by Naomi Santiago APRN No    Short interval between pregnancies affecting pregnancy, antepartum 10/22/2021 by Jerri Soler MD No    Supervision of high risk pregnancy in third trimester 10/22/2021 by Jerri Soler MD No    Overview Signed 2022  4:43 PM by Naomi Santiago APRN     WBC   Date Value Ref Range Status   2022 14.17 (H) 3.40 - 10.80 10*3/mm3 Final     RBC   Date Value Ref Range Status   2022 4.30 3.77 - 5.28 10*6/mm3 Final     Hemoglobin   Date Value Ref Range Status   2022 10.3 (L) 12.0 - 15.9 g/dL Final     Hematocrit   Date Value Ref Range Status   2022 32.9 (L) 34.0 - 46.6 % Final     MCV   Date Value Ref Range Status   2022 76.5 (L) 79.0 - 97.0 fL Final     MCH   Date Value Ref Range Status   2022 24.0 (L) 26.6 - 33.0 pg Final     MCHC   Date Value Ref Range Status   2022 31.3 (L) 31.5 - 35.7 g/dL Final     RDW   Date Value Ref Range Status   2022 13.9 12.3 - 15.4 % Final     RDW-SD   Date Value Ref Range Status   2022 37.5 37.0 - 54.0 fl Final     MPV   Date Value Ref Range Status   2022 9.3 6.0 - 12.0 fL Final     Platelets   Date Value Ref Range Status   2022 265 140 - 450 10*3/mm3 Final     Neutrophil %   Date Value Ref Range Status   2021 70.7 42.7 - 76.0 % Final     Lymphocyte %   Date Value Ref Range Status   2021 18.6 (L) 19.6 - 45.3 % Final     Monocyte %   Date  Value Ref Range Status   2021 5.2 5.0 - 12.0 % Final     Eosinophil %   Date Value Ref Range Status   2021 4.6 0.3 - 6.2 % Final     Basophil %   Date Value Ref Range Status   2021 0.6 0.0 - 1.5 % Final     Immature Grans %   Date Value Ref Range Status   2021 0.3 0.0 - 0.5 % Final     Neutrophils, Absolute   Date Value Ref Range Status   2021 6.37 1.70 - 7.00 10*3/mm3 Final     Lymphocytes, Absolute   Date Value Ref Range Status   2021 1.67 0.70 - 3.10 10*3/mm3 Final     Monocytes, Absolute   Date Value Ref Range Status   2021 0.47 0.10 - 0.90 10*3/mm3 Final     Eosinophils, Absolute   Date Value Ref Range Status   2021 0.41 (H) 0.00 - 0.40 10*3/mm3 Final     Basophils, Absolute   Date Value Ref Range Status   2021 0.05 0.00 - 0.20 10*3/mm3 Final     Immature Grans, Absolute   Date Value Ref Range Status   2021 0.03 0.00 - 0.05 10*3/mm3 Final     nRBC   Date Value Ref Range Status   2021 0.0 0.0 - 0.2 /100 WBC Final            Shoulder dystocia during labor and delivery 3/18/2021 by Jerir Soler MD No    Overview Signed 3/18/2021  2:55 PM by Jerri Soler MD     Mc[employer] and suprapubic pressure               A/P: Kenia Aeljandra Zee is a 21 y.o.  at 32w5d.  - RTC in 2 weeks  - Reviewed COVID-19 visitation policy  - Reviewed COVID-19 precautions     Diagnosis Plan   1. Supervision of high risk pregnancy in third trimester     2. Iron deficiency anemia during pregnancy     3. Short interval between pregnancies affecting pregnancy, antepartum     4. Shoulder dystocia during labor and delivery       Naomi Santiago, APRN  2022  09:51 CST

## 2022-02-25 ENCOUNTER — ROUTINE PRENATAL (OUTPATIENT)
Dept: OBSTETRICS AND GYNECOLOGY | Facility: CLINIC | Age: 22
End: 2022-02-25

## 2022-02-25 VITALS — WEIGHT: 185.4 LBS | SYSTOLIC BLOOD PRESSURE: 110 MMHG | DIASTOLIC BLOOD PRESSURE: 66 MMHG | BODY MASS INDEX: 31.82 KG/M2

## 2022-02-25 DIAGNOSIS — O99.019 IRON DEFICIENCY ANEMIA DURING PREGNANCY: ICD-10-CM

## 2022-02-25 DIAGNOSIS — O09.899 SHORT INTERVAL BETWEEN PREGNANCIES AFFECTING PREGNANCY, ANTEPARTUM: ICD-10-CM

## 2022-02-25 DIAGNOSIS — D50.9 IRON DEFICIENCY ANEMIA DURING PREGNANCY: ICD-10-CM

## 2022-02-25 DIAGNOSIS — O09.93 SUPERVISION OF HIGH RISK PREGNANCY IN THIRD TRIMESTER: Primary | ICD-10-CM

## 2022-02-25 PROCEDURE — 0502F SUBSEQUENT PRENATAL CARE: CPT | Performed by: NURSE PRACTITIONER

## 2022-02-25 NOTE — PROGRESS NOTES
CC: Prenatal visit    Kenia Koch is a 21 y.o.  at 34w5d.  Doing well.  Denies contractions, LOF, or VB.  Reports good FM. Weight gain of 4lb. Has had mild swelling in the ankles but relieved with elevation. BP is good. Has ability to monitor and home and will do so.     /66   Wt 84.1 kg (185 lb 6.4 oz)   LMP  (LMP Unknown)   BMI 31.82 kg/m²     Fundal Height (cm): 33 cm  Fetal Heart Rate: 140     Problems (from 21 to present)     Problem Noted Resolved    Iron deficiency anemia during pregnancy 2022 by Naomi Santiago APRN No    Short interval between pregnancies affecting pregnancy, antepartum 10/22/2021 by Jerri Soler MD No    Supervision of high risk pregnancy in third trimester 10/22/2021 by Jerri Soler MD No    Overview Signed 2022  4:43 PM by Naomi Santiago APRN     WBC   Date Value Ref Range Status   2022 14.17 (H) 3.40 - 10.80 10*3/mm3 Final     RBC   Date Value Ref Range Status   2022 4.30 3.77 - 5.28 10*6/mm3 Final     Hemoglobin   Date Value Ref Range Status   2022 10.3 (L) 12.0 - 15.9 g/dL Final     Hematocrit   Date Value Ref Range Status   2022 32.9 (L) 34.0 - 46.6 % Final     MCV   Date Value Ref Range Status   2022 76.5 (L) 79.0 - 97.0 fL Final     MCH   Date Value Ref Range Status   2022 24.0 (L) 26.6 - 33.0 pg Final     MCHC   Date Value Ref Range Status   2022 31.3 (L) 31.5 - 35.7 g/dL Final     RDW   Date Value Ref Range Status   2022 13.9 12.3 - 15.4 % Final     RDW-SD   Date Value Ref Range Status   2022 37.5 37.0 - 54.0 fl Final     MPV   Date Value Ref Range Status   2022 9.3 6.0 - 12.0 fL Final     Platelets   Date Value Ref Range Status   2022 265 140 - 450 10*3/mm3 Final     Neutrophil %   Date Value Ref Range Status   2021 70.7 42.7 - 76.0 % Final     Lymphocyte %   Date Value Ref Range Status   2021 18.6  (L) 19.6 - 45.3 % Final     Monocyte %   Date Value Ref Range Status   2021 5.2 5.0 - 12.0 % Final     Eosinophil %   Date Value Ref Range Status   2021 4.6 0.3 - 6.2 % Final     Basophil %   Date Value Ref Range Status   2021 0.6 0.0 - 1.5 % Final     Immature Grans %   Date Value Ref Range Status   2021 0.3 0.0 - 0.5 % Final     Neutrophils, Absolute   Date Value Ref Range Status   2021 6.37 1.70 - 7.00 10*3/mm3 Final     Lymphocytes, Absolute   Date Value Ref Range Status   2021 1.67 0.70 - 3.10 10*3/mm3 Final     Monocytes, Absolute   Date Value Ref Range Status   2021 0.47 0.10 - 0.90 10*3/mm3 Final     Eosinophils, Absolute   Date Value Ref Range Status   2021 0.41 (H) 0.00 - 0.40 10*3/mm3 Final     Basophils, Absolute   Date Value Ref Range Status   2021 0.05 0.00 - 0.20 10*3/mm3 Final     Immature Grans, Absolute   Date Value Ref Range Status   2021 0.03 0.00 - 0.05 10*3/mm3 Final     nRBC   Date Value Ref Range Status   2021 0.0 0.0 - 0.2 /100 WBC Final            Shoulder dystocia during labor and delivery 3/18/2021 by Jerri Soler MD No    Overview Signed 3/18/2021  2:55 PM by Jerri Soler MD     Mc[employer]'s and suprapubic pressure               A/P: Kenia Koch is a 21 y.o.  at 34w5d.  - RTC in 2 weeks with Dr. Soler for remaining prenatal care.   - Check BP at home. RTC with elevation.   - Kick counts reviewed.   - Signs of labor reviewed.   - Reviewed COVID-19 visitation policy  - Reviewed COVID-19 precautions     Diagnosis Plan   1. Supervision of high risk pregnancy in third trimester     2. Iron deficiency anemia during pregnancy     3. Short interval between pregnancies affecting pregnancy, antepartum     4. Shoulder dystocia during labor and delivery       Naomi Santiago, APRN  2022  09:58 CST

## 2022-03-11 ENCOUNTER — ROUTINE PRENATAL (OUTPATIENT)
Dept: OBSTETRICS AND GYNECOLOGY | Facility: CLINIC | Age: 22
End: 2022-03-11

## 2022-03-11 VITALS — WEIGHT: 191.4 LBS | DIASTOLIC BLOOD PRESSURE: 72 MMHG | SYSTOLIC BLOOD PRESSURE: 140 MMHG | BODY MASS INDEX: 32.85 KG/M2

## 2022-03-11 DIAGNOSIS — D50.9 IRON DEFICIENCY ANEMIA DURING PREGNANCY: ICD-10-CM

## 2022-03-11 DIAGNOSIS — Z3A.36 36 WEEKS GESTATION OF PREGNANCY: ICD-10-CM

## 2022-03-11 DIAGNOSIS — O09.899 SHORT INTERVAL BETWEEN PREGNANCIES AFFECTING PREGNANCY, ANTEPARTUM: ICD-10-CM

## 2022-03-11 DIAGNOSIS — O09.93 SUPERVISION OF HIGH RISK PREGNANCY IN THIRD TRIMESTER: Primary | ICD-10-CM

## 2022-03-11 DIAGNOSIS — O99.019 IRON DEFICIENCY ANEMIA DURING PREGNANCY: ICD-10-CM

## 2022-03-11 PROCEDURE — 87653 STREP B DNA AMP PROBE: CPT | Performed by: OBSTETRICS & GYNECOLOGY

## 2022-03-11 PROCEDURE — 0502F SUBSEQUENT PRENATAL CARE: CPT | Performed by: OBSTETRICS & GYNECOLOGY

## 2022-03-11 NOTE — PROGRESS NOTES
CC: Prenatal visit    Kenia Koch is a 21 y.o.  at 36w5d.  Doing well.  Denies contractions, LOF, or VB.  Reports good FM.    /72   Wt 86.8 kg (191 lb 6.4 oz)   LMP  (LMP Unknown)   BMI 32.85 kg/m²   SVE: FT/thick/high/soft/posterior  BSUS: cephalic     Fetal Heart Rate: 140     Problems (from 21 to present)     Problem Noted Resolved    Iron deficiency anemia during pregnancy 2022 by Naomi Santiago, KELLI No    Short interval between pregnancies affecting pregnancy, antepartum 10/22/2021 by Jerri Soler MD No    Supervision of high risk pregnancy in third trimester 10/22/2021 by Jerri Soler MD No    Overview Signed 2022  4:43 PM by Naomi Santiago, KELLI     WBC   Date Value Ref Range Status   2022 14.17 (H) 3.40 - 10.80 10*3/mm3 Final     RBC   Date Value Ref Range Status   2022 4.30 3.77 - 5.28 10*6/mm3 Final     Hemoglobin   Date Value Ref Range Status   2022 10.3 (L) 12.0 - 15.9 g/dL Final     Hematocrit   Date Value Ref Range Status   2022 32.9 (L) 34.0 - 46.6 % Final     MCV   Date Value Ref Range Status   2022 76.5 (L) 79.0 - 97.0 fL Final     MCH   Date Value Ref Range Status   2022 24.0 (L) 26.6 - 33.0 pg Final     MCHC   Date Value Ref Range Status   2022 31.3 (L) 31.5 - 35.7 g/dL Final     RDW   Date Value Ref Range Status   2022 13.9 12.3 - 15.4 % Final     RDW-SD   Date Value Ref Range Status   2022 37.5 37.0 - 54.0 fl Final     MPV   Date Value Ref Range Status   2022 9.3 6.0 - 12.0 fL Final     Platelets   Date Value Ref Range Status   2022 265 140 - 450 10*3/mm3 Final     Neutrophil %   Date Value Ref Range Status   2021 70.7 42.7 - 76.0 % Final     Lymphocyte %   Date Value Ref Range Status   2021 18.6 (L) 19.6 - 45.3 % Final     Monocyte %   Date Value Ref Range Status   2021 5.2 5.0 - 12.0 % Final     Eosinophil  %   Date Value Ref Range Status   2021 4.6 0.3 - 6.2 % Final     Basophil %   Date Value Ref Range Status   2021 0.6 0.0 - 1.5 % Final     Immature Grans %   Date Value Ref Range Status   2021 0.3 0.0 - 0.5 % Final     Neutrophils, Absolute   Date Value Ref Range Status   2021 6.37 1.70 - 7.00 10*3/mm3 Final     Lymphocytes, Absolute   Date Value Ref Range Status   2021 1.67 0.70 - 3.10 10*3/mm3 Final     Monocytes, Absolute   Date Value Ref Range Status   2021 0.47 0.10 - 0.90 10*3/mm3 Final     Eosinophils, Absolute   Date Value Ref Range Status   2021 0.41 (H) 0.00 - 0.40 10*3/mm3 Final     Basophils, Absolute   Date Value Ref Range Status   2021 0.05 0.00 - 0.20 10*3/mm3 Final     Immature Grans, Absolute   Date Value Ref Range Status   2021 0.03 0.00 - 0.05 10*3/mm3 Final     nRBC   Date Value Ref Range Status   2021 0.0 0.0 - 0.2 /100 WBC Final              Shoulder dystocia during labor and delivery 3/18/2021 by Jerri Soler MD No    Overview Signed 3/18/2021  2:55 PM by Jerri Soler MD     Mc[employer]'s and suprapubic pressure                 A/P: Kenia Koch is a 21 y.o.  at 36w5d.  - RTC in 1 week  - GBS today  - Declines EIOL, desires natural labor.  Discussed recommendation for IOL at 41 wks if no labor; patient agreeable.  - I called patient after her appointment as she was positive for COVID in January; she states that she was tested at [facility] in [place].  At her next appt, will obtain release of records for that test result and scan into the system.  - Reviewed COVID-19 visitation policy  - Reviewed COVID-19 precautions     Diagnosis Plan   1. Supervision of high risk pregnancy in third trimester     2. Short interval between pregnancies affecting pregnancy, antepartum     3. Shoulder dystocia during labor and delivery     4. Iron deficiency anemia during pregnancy     5. 36 weeks gestation  of pregnancy  Group B Strep (Molecular) - Swab, Vaginal/Rectum    Group B Strep (Molecular) - Swab, Vaginal/Rectum     Jerri Soler MD  3/11/2022  11:53 CST

## 2022-03-14 ENCOUNTER — TELEPHONE (OUTPATIENT)
Dept: OBSTETRICS AND GYNECOLOGY | Facility: CLINIC | Age: 22
End: 2022-03-14

## 2022-03-14 NOTE — TELEPHONE ENCOUNTER
----- Message from Jerri Soler MD sent at 3/14/2022  8:18 AM CDT -----  Regarding: FW: lab results  Contact: [phone]  Please call and let her know that her GBS will need to be recollected as there was an issue with running it in the lab. Naomi can do it for her tomorrow.    ----- Message -----  From: Ingrid Brown  Sent: 3/13/2022  11:11 AM CDT  To: Jerri Soler MD  Subject: lab results                                      Please recollect if needed, results were invalid X3. Thank You

## 2022-03-14 NOTE — TELEPHONE ENCOUNTER
Patient returned call, notified that GBS was unable to be ran and will need to be recollected which Naomi can do at her visit tomorrow per dr phan.    Patient verbalized understanding

## 2022-03-14 NOTE — TELEPHONE ENCOUNTER
----- Message from Jerri Soler MD sent at 3/14/2022  8:18 AM CDT -----  Regarding: FW: lab results  Contact: [phone]  Please call and let her know that her GBS will need to be recollected as there was an issue with running it in the lab. Naomi can do it for her tomorrow.    ----- Message -----  From: Ingrid Brown  Sent: 3/13/2022  11:11 AM CDT  To: Jerri Soler MD  Subject: lab results                                      Please recollect if needed, results were invalid X3. Thank You

## 2022-03-15 ENCOUNTER — LAB (OUTPATIENT)
Dept: LAB | Facility: OTHER | Age: 22
End: 2022-03-15

## 2022-03-15 ENCOUNTER — ROUTINE PRENATAL (OUTPATIENT)
Dept: OBSTETRICS AND GYNECOLOGY | Facility: CLINIC | Age: 22
End: 2022-03-15

## 2022-03-15 VITALS — BODY MASS INDEX: 32.65 KG/M2 | WEIGHT: 190.2 LBS | SYSTOLIC BLOOD PRESSURE: 110 MMHG | DIASTOLIC BLOOD PRESSURE: 68 MMHG

## 2022-03-15 DIAGNOSIS — O09.93 SUPERVISION OF HIGH RISK PREGNANCY IN THIRD TRIMESTER: Primary | ICD-10-CM

## 2022-03-15 DIAGNOSIS — Z36.85 SCREENING, ANTENATAL, FOR STREPTOCOCCUS B: ICD-10-CM

## 2022-03-15 DIAGNOSIS — D50.9 IRON DEFICIENCY ANEMIA DURING PREGNANCY: ICD-10-CM

## 2022-03-15 DIAGNOSIS — O09.92 SUPERVISION OF HIGH RISK PREGNANCY IN SECOND TRIMESTER: Primary | ICD-10-CM

## 2022-03-15 DIAGNOSIS — O99.019 IRON DEFICIENCY ANEMIA DURING PREGNANCY: ICD-10-CM

## 2022-03-15 DIAGNOSIS — O09.899 SHORT INTERVAL BETWEEN PREGNANCIES AFFECTING PREGNANCY, ANTEPARTUM: ICD-10-CM

## 2022-03-15 PROCEDURE — 0502F SUBSEQUENT PRENATAL CARE: CPT | Performed by: NURSE PRACTITIONER

## 2022-03-15 PROCEDURE — 87653 STREP B DNA AMP PROBE: CPT | Performed by: NURSE PRACTITIONER

## 2022-03-15 NOTE — PROGRESS NOTES
CC: Prenatal visit    Kenia Koch is a 21 y.o.  at 37w2d.  Doing well.  Denies contractions, LOF, or VB.  Reports good FM. GBS recollected today. Last weeks resulted invalid.     /68   Wt 86.3 kg (190 lb 3.2 oz)   LMP  (LMP Unknown)   BMI 32.65 kg/m²     Fundal Height (cm): 38 cm  Fetal Heart Rate: 145     Problems (from 21 to present)     Problem Noted Resolved    Iron deficiency anemia during pregnancy 2022 by Naomi Santiago APRN No    Short interval between pregnancies affecting pregnancy, antepartum 10/22/2021 by Jerri Soler MD No    Supervision of high risk pregnancy in third trimester 10/22/2021 by Jerri Soler MD No    Overview Signed 2022  4:43 PM by Naomi Santiago APRN     WBC   Date Value Ref Range Status   2022 14.17 (H) 3.40 - 10.80 10*3/mm3 Final     RBC   Date Value Ref Range Status   2022 4.30 3.77 - 5.28 10*6/mm3 Final     Hemoglobin   Date Value Ref Range Status   2022 10.3 (L) 12.0 - 15.9 g/dL Final     Hematocrit   Date Value Ref Range Status   2022 32.9 (L) 34.0 - 46.6 % Final     MCV   Date Value Ref Range Status   2022 76.5 (L) 79.0 - 97.0 fL Final     MCH   Date Value Ref Range Status   2022 24.0 (L) 26.6 - 33.0 pg Final     MCHC   Date Value Ref Range Status   2022 31.3 (L) 31.5 - 35.7 g/dL Final     RDW   Date Value Ref Range Status   2022 13.9 12.3 - 15.4 % Final     RDW-SD   Date Value Ref Range Status   2022 37.5 37.0 - 54.0 fl Final     MPV   Date Value Ref Range Status   2022 9.3 6.0 - 12.0 fL Final     Platelets   Date Value Ref Range Status   2022 265 140 - 450 10*3/mm3 Final     Neutrophil %   Date Value Ref Range Status   2021 70.7 42.7 - 76.0 % Final     Lymphocyte %   Date Value Ref Range Status   2021 18.6 (L) 19.6 - 45.3 % Final     Monocyte %   Date Value Ref Range Status   2021 5.2 5.0 -  12.0 % Final     Eosinophil %   Date Value Ref Range Status   2021 4.6 0.3 - 6.2 % Final     Basophil %   Date Value Ref Range Status   2021 0.6 0.0 - 1.5 % Final     Immature Grans %   Date Value Ref Range Status   2021 0.3 0.0 - 0.5 % Final     Neutrophils, Absolute   Date Value Ref Range Status   2021 6.37 1.70 - 7.00 10*3/mm3 Final     Lymphocytes, Absolute   Date Value Ref Range Status   2021 1.67 0.70 - 3.10 10*3/mm3 Final     Monocytes, Absolute   Date Value Ref Range Status   2021 0.47 0.10 - 0.90 10*3/mm3 Final     Eosinophils, Absolute   Date Value Ref Range Status   2021 0.41 (H) 0.00 - 0.40 10*3/mm3 Final     Basophils, Absolute   Date Value Ref Range Status   2021 0.05 0.00 - 0.20 10*3/mm3 Final     Immature Grans, Absolute   Date Value Ref Range Status   2021 0.03 0.00 - 0.05 10*3/mm3 Final     nRBC   Date Value Ref Range Status   2021 0.0 0.0 - 0.2 /100 WBC Final              Shoulder dystocia during labor and delivery 3/18/2021 by Jerri Soler MD No    Overview Signed 3/18/2021  2:55 PM by Jerri Soler MD     Mc[employer]'s and suprapubic pressure                 A/P: Kenia Taylorars is a 21 y.o.  at 37w2d.  - RTC in 1 week as scheduled with Dr. Soler   - GBS recollected today.   - Record release signed for COVID results at [facility] in [place].   - Reviewed COVID-19 visitation policy  - Reviewed COVID-19 precautions     Diagnosis Plan   1. Supervision of high risk pregnancy in third trimester     2. Short interval between pregnancies affecting pregnancy, antepartum     3. Shoulder dystocia during labor and delivery     4. Iron deficiency anemia during pregnancy     5. Screening, , for Streptococcus B  Group B Streptococcus Culture - Swab, Vaginal/Rectum     Naomi Santiago, KELLI  3/15/2022  09:57 CDT

## 2022-03-16 LAB — GROUP B STREP, DNA: NEGATIVE

## 2022-03-24 ENCOUNTER — ROUTINE PRENATAL (OUTPATIENT)
Dept: OBSTETRICS AND GYNECOLOGY | Facility: CLINIC | Age: 22
End: 2022-03-24

## 2022-03-24 VITALS — WEIGHT: 194 LBS | DIASTOLIC BLOOD PRESSURE: 84 MMHG | BODY MASS INDEX: 33.3 KG/M2 | SYSTOLIC BLOOD PRESSURE: 132 MMHG

## 2022-03-24 DIAGNOSIS — D50.9 IRON DEFICIENCY ANEMIA DURING PREGNANCY: ICD-10-CM

## 2022-03-24 DIAGNOSIS — O09.93 SUPERVISION OF HIGH RISK PREGNANCY IN THIRD TRIMESTER: Primary | ICD-10-CM

## 2022-03-24 DIAGNOSIS — Z3A.38 38 WEEKS GESTATION OF PREGNANCY: ICD-10-CM

## 2022-03-24 DIAGNOSIS — O99.019 IRON DEFICIENCY ANEMIA DURING PREGNANCY: ICD-10-CM

## 2022-03-24 DIAGNOSIS — O09.899 SHORT INTERVAL BETWEEN PREGNANCIES AFFECTING PREGNANCY, ANTEPARTUM: ICD-10-CM

## 2022-03-24 PROCEDURE — 0502F SUBSEQUENT PRENATAL CARE: CPT | Performed by: OBSTETRICS & GYNECOLOGY

## 2022-03-24 RX ORDER — IBUPROFEN 200 MG
800 TABLET ORAL EVERY 8 HOURS
Status: CANCELLED | OUTPATIENT
Start: 2022-03-24

## 2022-03-24 RX ORDER — SODIUM CHLORIDE 0.9 % (FLUSH) 0.9 %
3-10 SYRINGE (ML) INJECTION AS NEEDED
Status: CANCELLED | OUTPATIENT
Start: 2022-03-24

## 2022-03-24 RX ORDER — ONDANSETRON 2 MG/ML
4 INJECTION INTRAMUSCULAR; INTRAVENOUS EVERY 6 HOURS PRN
Status: CANCELLED | OUTPATIENT
Start: 2022-03-24

## 2022-03-24 RX ORDER — PROMETHAZINE HYDROCHLORIDE 25 MG/1
12.5 SUPPOSITORY RECTAL EVERY 6 HOURS PRN
Status: CANCELLED | OUTPATIENT
Start: 2022-03-24

## 2022-03-24 RX ORDER — LIDOCAINE HYDROCHLORIDE 10 MG/ML
5 INJECTION, SOLUTION EPIDURAL; INFILTRATION; INTRACAUDAL; PERINEURAL AS NEEDED
Status: CANCELLED | OUTPATIENT
Start: 2022-03-24

## 2022-03-24 RX ORDER — MISOPROSTOL 100 UG/1
800 TABLET ORAL AS NEEDED
Status: CANCELLED | OUTPATIENT
Start: 2022-03-24

## 2022-03-24 RX ORDER — SODIUM CHLORIDE 0.9 % (FLUSH) 0.9 %
3 SYRINGE (ML) INJECTION EVERY 12 HOURS SCHEDULED
Status: CANCELLED | OUTPATIENT
Start: 2022-03-24

## 2022-03-24 RX ORDER — METHYLERGONOVINE MALEATE 0.2 MG/ML
200 INJECTION INTRAVENOUS ONCE AS NEEDED
Status: CANCELLED | OUTPATIENT
Start: 2022-03-24

## 2022-03-24 RX ORDER — ONDANSETRON 4 MG/1
4 TABLET, FILM COATED ORAL EVERY 6 HOURS PRN
Status: CANCELLED | OUTPATIENT
Start: 2022-03-24

## 2022-03-24 RX ORDER — PROMETHAZINE HYDROCHLORIDE 25 MG/1
25 TABLET ORAL EVERY 6 HOURS PRN
Status: CANCELLED | OUTPATIENT
Start: 2022-03-24

## 2022-03-24 RX ORDER — CARBOPROST TROMETHAMINE 250 UG/ML
250 INJECTION, SOLUTION INTRAMUSCULAR AS NEEDED
Status: CANCELLED | OUTPATIENT
Start: 2022-03-24

## 2022-03-24 RX ORDER — SODIUM CHLORIDE, SODIUM LACTATE, POTASSIUM CHLORIDE, CALCIUM CHLORIDE 600; 310; 30; 20 MG/100ML; MG/100ML; MG/100ML; MG/100ML
125 INJECTION, SOLUTION INTRAVENOUS CONTINUOUS
Status: CANCELLED | OUTPATIENT
Start: 2022-03-24

## 2022-03-24 RX ORDER — BUTORPHANOL TARTRATE 1 MG/ML
2 INJECTION, SOLUTION INTRAMUSCULAR; INTRAVENOUS
Status: CANCELLED | OUTPATIENT
Start: 2022-03-24

## 2022-03-24 RX ORDER — ACETAMINOPHEN 325 MG/1
1000 TABLET ORAL EVERY 6 HOURS
Status: CANCELLED | OUTPATIENT
Start: 2022-03-24

## 2022-03-24 RX ORDER — BUTORPHANOL TARTRATE 1 MG/ML
1 INJECTION, SOLUTION INTRAMUSCULAR; INTRAVENOUS
Status: CANCELLED | OUTPATIENT
Start: 2022-03-24

## 2022-03-24 RX ORDER — OXYTOCIN 10 [USP'U]/ML
650 INJECTION, SOLUTION INTRAMUSCULAR; INTRAVENOUS ONCE
Status: CANCELLED | OUTPATIENT
Start: 2022-03-24

## 2022-03-24 RX ORDER — MISOPROSTOL 100 MCG
50 TABLET ORAL EVERY 6 HOURS
Status: CANCELLED | OUTPATIENT
Start: 2022-03-24 | End: 2022-03-25

## 2022-03-24 RX ORDER — OXYTOCIN 10 [USP'U]/ML
85 INJECTION, SOLUTION INTRAMUSCULAR; INTRAVENOUS ONCE
Status: CANCELLED | OUTPATIENT
Start: 2022-03-24

## 2022-03-24 NOTE — PROGRESS NOTES
CC: Prenatal visit    Kenia Koch is a 21 y.o.  at 38w4d.  Doing well.  Denies contractions, LOF, or VB.  Reports good FM.  Feeling some pressure.    /84   Wt 88 kg (194 lb)   LMP  (LMP Unknown)   BMI 33.30 kg/m²   SVE: FT/70/-3/soft/posterior, vertex  BSUS: cephalic     Fetal Heart Rate: 140s     Problems (from 21 to present)     Problem Noted Resolved    Iron deficiency anemia during pregnancy 2022 by Naomi Santiago APRN No    Short interval between pregnancies affecting pregnancy, antepartum 10/22/2021 by Jerri Soler MD No    Supervision of high risk pregnancy in third trimester 10/22/2021 by Jerri Soler MD No    Overview Signed 2022  4:43 PM by Naomi Santiago APRN     WBC   Date Value Ref Range Status   2022 14.17 (H) 3.40 - 10.80 10*3/mm3 Final     RBC   Date Value Ref Range Status   2022 4.30 3.77 - 5.28 10*6/mm3 Final     Hemoglobin   Date Value Ref Range Status   2022 10.3 (L) 12.0 - 15.9 g/dL Final     Hematocrit   Date Value Ref Range Status   2022 32.9 (L) 34.0 - 46.6 % Final     MCV   Date Value Ref Range Status   2022 76.5 (L) 79.0 - 97.0 fL Final     MCH   Date Value Ref Range Status   2022 24.0 (L) 26.6 - 33.0 pg Final     MCHC   Date Value Ref Range Status   2022 31.3 (L) 31.5 - 35.7 g/dL Final     RDW   Date Value Ref Range Status   2022 13.9 12.3 - 15.4 % Final     RDW-SD   Date Value Ref Range Status   2022 37.5 37.0 - 54.0 fl Final     MPV   Date Value Ref Range Status   2022 9.3 6.0 - 12.0 fL Final     Platelets   Date Value Ref Range Status   2022 265 140 - 450 10*3/mm3 Final     Neutrophil %   Date Value Ref Range Status   2021 70.7 42.7 - 76.0 % Final     Lymphocyte %   Date Value Ref Range Status   2021 18.6 (L) 19.6 - 45.3 % Final     Monocyte %   Date Value Ref Range Status   2021 5.2 5.0 - 12.0 %  Final     Eosinophil %   Date Value Ref Range Status   2021 4.6 0.3 - 6.2 % Final     Basophil %   Date Value Ref Range Status   2021 0.6 0.0 - 1.5 % Final     Immature Grans %   Date Value Ref Range Status   2021 0.3 0.0 - 0.5 % Final     Neutrophils, Absolute   Date Value Ref Range Status   2021 6.37 1.70 - 7.00 10*3/mm3 Final     Lymphocytes, Absolute   Date Value Ref Range Status   2021 1.67 0.70 - 3.10 10*3/mm3 Final     Monocytes, Absolute   Date Value Ref Range Status   2021 0.47 0.10 - 0.90 10*3/mm3 Final     Eosinophils, Absolute   Date Value Ref Range Status   2021 0.41 (H) 0.00 - 0.40 10*3/mm3 Final     Basophils, Absolute   Date Value Ref Range Status   2021 0.05 0.00 - 0.20 10*3/mm3 Final     Immature Grans, Absolute   Date Value Ref Range Status   2021 0.03 0.00 - 0.05 10*3/mm3 Final     nRBC   Date Value Ref Range Status   2021 0.0 0.0 - 0.2 /100 WBC Final              Shoulder dystocia during labor and delivery 3/18/2021 by Jerri Soler MD No    Overview Signed 3/18/2021  2:55 PM by Jerri Soler MD     McRobert's and suprapubic pressure               A/P: Keniatiffanie Alejandra Zee is a 21 y.o.  at 38w4d.  - RTC in 1 week  - Prefers to wait until as close to 41 wks as possible. EIOL scheduled at 40w4d, Cytotec.  - Reviewed COVID-19 visitation policy  - Reviewed COVID-19 precautions     Diagnosis Plan   1. Supervision of high risk pregnancy in third trimester     2. Short interval between pregnancies affecting pregnancy, antepartum     3. Shoulder dystocia during labor and delivery     4. Iron deficiency anemia during pregnancy     5. 38 weeks gestation of pregnancy       Jerri Soler MD  3/24/2022  11:21 CDT

## 2022-03-24 NOTE — H&P
[facility]  HISTORY & PHYSICAL - Obstetrics    Name: Kenia Koch  MRN: [id number]  Location: Room/bed info not found  Date: 2022  CSN: [id number]      CHIEF COMPLAINT:  EIOL at 40w4d    HISTORY OF PRESENT ILLNESS  Kenia Koch is a 21 y.o.  at 38w4d wbo presents for RPN.  She is scheduled for EIOL at 40w4d.  Today denies LOF, vaginal bleeding, or regular contractions.  Reports good FM.    Patient denies any chest pain, palpitations, headaches, lightheadedness, shortness of breath, cough, nausea, vomiting, diarrhea, constipation, fever, or chills.    ROS  Review of Systems   Constitutional: Negative.    HENT: Negative.    Eyes: Negative.    Respiratory: Negative.    Cardiovascular: Negative.    Gastrointestinal: Negative.    Endocrine: Negative.    Genitourinary: Negative.    Musculoskeletal: Negative.    Skin: Negative.    Allergic/Immunologic: Negative.    Neurological: Negative.    Hematological: Negative.    Psychiatric/Behavioral: Negative.      PRENATAL LAB RESULTS  Prenatal labs reviewed  External Prenatal Results     Pregnancy Outside Results - Transcribed From Office Records - See Scanned Records For Details     Test Value Date Time    ABO  A  21 1042    Rh  Positive  21 1042    Antibody Screen  Negative  21 1042    Varicella IgG       Rubella  1.86 index 21 1042    Hgb  10.3 g/dL 22 1555       12.3 g/dL 21 1042    Hct  32.9 % 22 1555       37.6 % 21 1042    Glucose Fasting GTT       Glucose Tolerance Test 1 hour       Glucose Tolerance Test 3 hour       Gonorrhea (discrete)  Negative  21 1042    Chlamydia (discrete)  Negative  21 1042    RPR  Non-Reactive  21 1042    VDRL       Syphilis Antibody       HBsAg  Non-Reactive  21 1042    Herpes Simplex Virus PCR       Herpes Simplex VIrus Culture       HIV  Non-Reactive  21 1042    Hep C RNA Quant PCR       Hep C Antibody   Non-Reactive  09/08/21 1042    AFP       Group B Strep  Negative  03/15/22 1758    GBS Susceptibility to Clindamycin       GBS Susceptibility to Erythromycin       Fetal Fibronectin       Genetic Testing, Maternal Blood             Drug Screening     Test Value Date Time    Urine Drug Screen       Amphetamine Screen  Negative  09/08/21 1042    Barbiturate Screen  Negative  09/08/21 1042    Benzodiazepine Screen  Negative  09/08/21 1042    Methadone Screen  Negative  09/08/21 1042    Phencyclidine Screen  Negative  09/08/21 1042    Opiates Screen  Negative  09/08/21 1042    THC Screen  Negative  09/08/21 1042    Cocaine Screen       Propoxyphene Screen  Negative  09/08/21 1042    Buprenorphine Screen  Negative  09/08/21 1042    Methamphetamine Screen       Oxycodone Screen  Negative  09/08/21 1042    Tricyclic Antidepressants Screen  Negative  09/08/21 1042          Legend    ^: Historical                      PRENATAL RISK FACTORS  9/21 Problems (from 09/08/21 to present)     Problem Noted Resolved    Iron deficiency anemia during pregnancy 1/5/2022 by Naomi Santiago, KELLI No    Short interval between pregnancies affecting pregnancy, antepartum 10/22/2021 by Jerri Soler MD No    Supervision of high risk pregnancy in third trimester 10/22/2021 by Jerri Soler MD No    Overview Signed 1/5/2022  4:43 PM by Naomi Santiago APRN     WBC   Date Value Ref Range Status   01/05/2022 14.17 (H) 3.40 - 10.80 10*3/mm3 Final     RBC   Date Value Ref Range Status   01/05/2022 4.30 3.77 - 5.28 10*6/mm3 Final     Hemoglobin   Date Value Ref Range Status   01/05/2022 10.3 (L) 12.0 - 15.9 g/dL Final     Hematocrit   Date Value Ref Range Status   01/05/2022 32.9 (L) 34.0 - 46.6 % Final     MCV   Date Value Ref Range Status   01/05/2022 76.5 (L) 79.0 - 97.0 fL Final     MCH   Date Value Ref Range Status   01/05/2022 24.0 (L) 26.6 - 33.0 pg Final     MCHC   Date Value Ref  Range Status   2022 31.3 (L) 31.5 - 35.7 g/dL Final     RDW   Date Value Ref Range Status   2022 13.9 12.3 - 15.4 % Final     RDW-SD   Date Value Ref Range Status   2022 37.5 37.0 - 54.0 fl Final     MPV   Date Value Ref Range Status   2022 9.3 6.0 - 12.0 fL Final     Platelets   Date Value Ref Range Status   2022 265 140 - 450 10*3/mm3 Final     Neutrophil %   Date Value Ref Range Status   2021 70.7 42.7 - 76.0 % Final     Lymphocyte %   Date Value Ref Range Status   2021 18.6 (L) 19.6 - 45.3 % Final     Monocyte %   Date Value Ref Range Status   2021 5.2 5.0 - 12.0 % Final     Eosinophil %   Date Value Ref Range Status   2021 4.6 0.3 - 6.2 % Final     Basophil %   Date Value Ref Range Status   2021 0.6 0.0 - 1.5 % Final     Immature Grans %   Date Value Ref Range Status   2021 0.3 0.0 - 0.5 % Final     Neutrophils, Absolute   Date Value Ref Range Status   2021 6.37 1.70 - 7.00 10*3/mm3 Final     Lymphocytes, Absolute   Date Value Ref Range Status   2021 1.67 0.70 - 3.10 10*3/mm3 Final     Monocytes, Absolute   Date Value Ref Range Status   2021 0.47 0.10 - 0.90 10*3/mm3 Final     Eosinophils, Absolute   Date Value Ref Range Status   2021 0.41 (H) 0.00 - 0.40 10*3/mm3 Final     Basophils, Absolute   Date Value Ref Range Status   2021 0.05 0.00 - 0.20 10*3/mm3 Final     Immature Grans, Absolute   Date Value Ref Range Status   2021 0.03 0.00 - 0.05 10*3/mm3 Final     nRBC   Date Value Ref Range Status   2021 0.0 0.0 - 0.2 /100 WBC Final              Shoulder dystocia during labor and delivery 3/18/2021 by Jerri Soler MD No    Overview Signed 3/18/2021  2:55 PM by Jerri Soler MD     McRobert's and suprapubic pressure               OB HISTORY  OB History    Para Term  AB Living   2 1 1 0 0 1   SAB IAB Ectopic Molar Multiple Live Births   0 0 0 0 0 1      # Outcome  Date GA Lbr Yordy/2nd Weight Sex Delivery Anes PTL Lv   2 Current            1 Term 03/18/21 41w1d 07:07 / 00:19 4020 g (8 lb 13.8 oz) F Vag-Spont None N ARIANA     GYN HISTORY  Denies h/o sexually transmitted infections/pelvic inflammatory disease  Denies h/o gynecologic surgeries, including biopsies of the cervix    PAST MEDICAL HISTORY  History reviewed. No pertinent past medical history.    PAST SURGICAL HISTORY  Past Surgical History:   Procedure Laterality Date   • ADENOIDECTOMY     • TONSILLECTOMY     • WISDOM TOOTH EXTRACTION       FAMILY HISTORY  Family History   Problem Relation Age of Onset   • Hypertension Father    • No Known Problems Mother    • Hypertension Paternal Grandfather    • Heart block Paternal Grandfather    • Arthritis Paternal Grandfather    • ALS Paternal Grandmother    • Hypertension Paternal Grandmother    • No Known Problems Maternal Grandmother    • No Known Problems Half-Brother    • No Known Problems Daughter    • No Known Problems Half-Sister      SOCIAL HISTORY  Social History     Socioeconomic History   • Marital status:    Tobacco Use   • Smoking status: Never Smoker   • Smokeless tobacco: Never Used   Substance and Sexual Activity   • Alcohol use: No   • Drug use: No   • Sexual activity: Yes     Partners: Male     Comment: pap smear 7/24/20 negative      ALLERGIES  No Known Allergies    HOME MEDICATIONS  Prior to Admission medications    Medication Sig Start Date End Date Taking? Authorizing Provider   ferrous sulfate 325 (65 FE) MG tablet Take 1 tablet by mouth 2 (Two) Times a Day. 1/5/22  Yes Naomi Santiago APRN   PRENATAL GUMMY VITAMIN Chew 1 each Daily.   Yes Provider, MD Toya   triamcinolone (KENALOG) 0.5 % cream Apply 1 application topically to the appropriate area as directed 2 (Two) Times a Day. 10/28/21  Yes Jerri Soler MD     PHYSICAL EXAM  /84   Wt 88 kg (194 lb)   LMP  (LMP Unknown)   BMI 33.30 kg/m²   General: No  acute distress. Well developed, well nourished. Pleasant.  Heart: Regular rate and rhythm. No murmurs, rubs, or gallops  Lungs: Clear to auscultation bilaterally. No wheezes, rales, or rhonchi.  Abdomen: Soft, nontender to palpation, enlarged by gravid uterus.    SVE: FT/ 70/ -3/ soft/ posterior, vertex    IMPRESSION  Kenia Koch is a 21 y.o.  scheduled for EIOL at 40w4d.  Will require some cervical ripening with Cytotec.    PLAN  1.  IUP at 40w4d with EIOL  - Admit: Labor and Delivery  - Attending: Dr. Soler  - Condition: Stable  - Vitals: per protocol  - Activity: ad scarlett  - Nursing: Continuous electronic fetal monitoring, as per protocol  - Diet: Clears  - IV fluids:  mL/hr  - Meds: SL Cytotec  - Allergies: NKDA  - Labs: CBC, T&S, UDS  - GBS: negative.  Antibiotics:  N/A  - Kenia Ny Zee and I have discussed pain goals for this hospitalization after reviewing her current clinical condition, medical history and prior pain experiences.  The goal is to keep her pain level appropriate.  Patient does not desire an epidural.  - Anticipate     This document has been electronically signed by Jerri Soler MD on 2022 11:24 CDT.

## 2022-03-30 ENCOUNTER — ROUTINE PRENATAL (OUTPATIENT)
Dept: OBSTETRICS AND GYNECOLOGY | Facility: CLINIC | Age: 22
End: 2022-03-30

## 2022-03-30 VITALS — WEIGHT: 195.4 LBS | SYSTOLIC BLOOD PRESSURE: 122 MMHG | BODY MASS INDEX: 33.54 KG/M2 | DIASTOLIC BLOOD PRESSURE: 80 MMHG

## 2022-03-30 DIAGNOSIS — O99.019 IRON DEFICIENCY ANEMIA DURING PREGNANCY: ICD-10-CM

## 2022-03-30 DIAGNOSIS — O09.93 SUPERVISION OF HIGH RISK PREGNANCY IN THIRD TRIMESTER: Primary | ICD-10-CM

## 2022-03-30 DIAGNOSIS — D50.9 IRON DEFICIENCY ANEMIA DURING PREGNANCY: ICD-10-CM

## 2022-03-30 DIAGNOSIS — O09.899 SHORT INTERVAL BETWEEN PREGNANCIES AFFECTING PREGNANCY, ANTEPARTUM: ICD-10-CM

## 2022-03-30 PROCEDURE — 0502F SUBSEQUENT PRENATAL CARE: CPT | Performed by: NURSE PRACTITIONER

## 2022-03-30 NOTE — PROGRESS NOTES
CC: Prenatal visit    Kenia Koch is a 21 y.o.  at 39w3d.  Doing well.  Denies contractions, LOF, or VB.  Reports good FM. Some pressure but no contractions.     /80   Wt 88.6 kg (195 lb 6.4 oz)   LMP  (LMP Unknown)   BMI 33.54 kg/m²     Fundal Height (cm): 39 cm  Fetal Heart Rate: 140     Problems (from 21 to present)     Problem Noted Resolved    Iron deficiency anemia during pregnancy 2022 by Naomi Santiago, KELLI No    Short interval between pregnancies affecting pregnancy, antepartum 10/22/2021 by Jerri Soler MD No    Supervision of high risk pregnancy in third trimester 10/22/2021 by Jerri Soler MD No    Overview Signed 2022  4:43 PM by Naomi Santiago APRN     WBC   Date Value Ref Range Status   2022 14.17 (H) 3.40 - 10.80 10*3/mm3 Final     RBC   Date Value Ref Range Status   2022 4.30 3.77 - 5.28 10*6/mm3 Final     Hemoglobin   Date Value Ref Range Status   2022 10.3 (L) 12.0 - 15.9 g/dL Final     Hematocrit   Date Value Ref Range Status   2022 32.9 (L) 34.0 - 46.6 % Final     MCV   Date Value Ref Range Status   2022 76.5 (L) 79.0 - 97.0 fL Final     MCH   Date Value Ref Range Status   2022 24.0 (L) 26.6 - 33.0 pg Final     MCHC   Date Value Ref Range Status   2022 31.3 (L) 31.5 - 35.7 g/dL Final     RDW   Date Value Ref Range Status   2022 13.9 12.3 - 15.4 % Final     RDW-SD   Date Value Ref Range Status   2022 37.5 37.0 - 54.0 fl Final     MPV   Date Value Ref Range Status   2022 9.3 6.0 - 12.0 fL Final     Platelets   Date Value Ref Range Status   2022 265 140 - 450 10*3/mm3 Final     Neutrophil %   Date Value Ref Range Status   2021 70.7 42.7 - 76.0 % Final     Lymphocyte %   Date Value Ref Range Status   2021 18.6 (L) 19.6 - 45.3 % Final     Monocyte %   Date Value Ref Range Status   2021 5.2 5.0 - 12.0 % Final      Eosinophil %   Date Value Ref Range Status   2021 4.6 0.3 - 6.2 % Final     Basophil %   Date Value Ref Range Status   2021 0.6 0.0 - 1.5 % Final     Immature Grans %   Date Value Ref Range Status   2021 0.3 0.0 - 0.5 % Final     Neutrophils, Absolute   Date Value Ref Range Status   2021 6.37 1.70 - 7.00 10*3/mm3 Final     Lymphocytes, Absolute   Date Value Ref Range Status   2021 1.67 0.70 - 3.10 10*3/mm3 Final     Monocytes, Absolute   Date Value Ref Range Status   2021 0.47 0.10 - 0.90 10*3/mm3 Final     Eosinophils, Absolute   Date Value Ref Range Status   2021 0.41 (H) 0.00 - 0.40 10*3/mm3 Final     Basophils, Absolute   Date Value Ref Range Status   2021 0.05 0.00 - 0.20 10*3/mm3 Final     Immature Grans, Absolute   Date Value Ref Range Status   2021 0.03 0.00 - 0.05 10*3/mm3 Final     nRBC   Date Value Ref Range Status   2021 0.0 0.0 - 0.2 /100 WBC Final              Shoulder dystocia during labor and delivery 3/18/2021 by Jerri Soler MD No    Overview Signed 3/18/2021  2:55 PM by Jerri Soler MD     McRobert's and suprapubic pressure                 A/P: Kenia Taylorars is a 21 y.o.  at 39w3d.  -EIOL at 40w4d scheduled next Thursday. Pt voices understanding of what she needs to do per Dr. Soler's instructions from last week.    -Proceed to labor and delivery with any signs of labor or concerns before that.    - Reviewed COVID-19 visitation policy  - Reviewed COVID-19 precautions     Diagnosis Plan   1. Supervision of high risk pregnancy in third trimester     2. Short interval between pregnancies affecting pregnancy, antepartum     3. Shoulder dystocia during labor and delivery     4. Iron deficiency anemia during pregnancy       Naomi Santiago, KELLI  3/30/2022  10:10 CDT

## 2022-04-01 ENCOUNTER — HOSPITAL ENCOUNTER (INPATIENT)
Facility: HOSPITAL | Age: 22
LOS: 1 days | Discharge: HOME | End: 2022-04-02
Attending: OBSTETRICS & GYNECOLOGY | Admitting: OBSTETRICS & GYNECOLOGY

## 2022-04-01 DIAGNOSIS — O09.93 SUPERVISION OF HIGH RISK PREGNANCY IN THIRD TRIMESTER: ICD-10-CM

## 2022-04-01 PROBLEM — Z37.9 NORMAL LABOR: Status: ACTIVE | Noted: 2022-04-01

## 2022-04-01 PROBLEM — Z34.90 ENCOUNTER FOR ELECTIVE INDUCTION OF LABOR: Status: ACTIVE | Noted: 2022-04-01

## 2022-04-01 LAB
ABO GROUP BLD: NORMAL
AMPHET+METHAMPHET UR QL: NEGATIVE
AMPHETAMINES UR QL: NEGATIVE
BARBITURATES UR QL SCN: NEGATIVE
BENZODIAZ UR QL SCN: NEGATIVE
BLD GP AB SCN SERPL QL: NEGATIVE
BUPRENORPHINE SERPL-MCNC: NEGATIVE NG/ML
CANNABINOIDS SERPL QL: NEGATIVE
COCAINE UR QL: NEGATIVE
DEPRECATED RDW RBC AUTO: 55.1 FL (ref 37–54)
ERYTHROCYTE [DISTWIDTH] IN BLOOD BY AUTOMATED COUNT: 21.1 % (ref 12.3–15.4)
HCT VFR BLD AUTO: 38 % (ref 34–46.6)
HGB BLD-MCNC: 11.9 G/DL (ref 12–15.9)
HOLD SPECIMEN: NORMAL
Lab: NORMAL
MCH RBC QN AUTO: 23.7 PG (ref 26.6–33)
MCHC RBC AUTO-ENTMCNC: 31.3 G/DL (ref 31.5–35.7)
MCV RBC AUTO: 75.5 FL (ref 79–97)
METHADONE UR QL SCN: NEGATIVE
OPIATES UR QL: NEGATIVE
OXYCODONE UR QL SCN: NEGATIVE
PCP UR QL SCN: NEGATIVE
PLATELET # BLD AUTO: 274 10*3/MM3 (ref 140–450)
PMV BLD AUTO: 11.5 FL (ref 6–12)
PROPOXYPH UR QL: NEGATIVE
RBC # BLD AUTO: 5.03 10*6/MM3 (ref 3.77–5.28)
RH BLD: POSITIVE
T&S EXPIRATION DATE: NORMAL
TRICYCLICS UR QL SCN: NEGATIVE
WBC NRBC COR # BLD: 12.58 10*3/MM3 (ref 3.4–10.8)
WHOLE BLOOD HOLD SPECIMEN: NORMAL

## 2022-04-01 PROCEDURE — 85027 COMPLETE CBC AUTOMATED: CPT | Performed by: OBSTETRICS & GYNECOLOGY

## 2022-04-01 PROCEDURE — 86901 BLOOD TYPING SEROLOGIC RH(D): CPT | Performed by: OBSTETRICS & GYNECOLOGY

## 2022-04-01 PROCEDURE — 86900 BLOOD TYPING SEROLOGIC ABO: CPT | Performed by: OBSTETRICS & GYNECOLOGY

## 2022-04-01 PROCEDURE — 86850 RBC ANTIBODY SCREEN: CPT | Performed by: OBSTETRICS & GYNECOLOGY

## 2022-04-01 PROCEDURE — 59400 OBSTETRICAL CARE: CPT | Performed by: OBSTETRICS & GYNECOLOGY

## 2022-04-01 PROCEDURE — 80306 DRUG TEST PRSMV INSTRMNT: CPT | Performed by: OBSTETRICS & GYNECOLOGY

## 2022-04-01 RX ORDER — OXYTOCIN/0.9 % SODIUM CHLORIDE 30/500 ML
650 PLASTIC BAG, INJECTION (ML) INTRAVENOUS ONCE
Status: DISCONTINUED | OUTPATIENT
Start: 2022-04-01 | End: 2022-04-02 | Stop reason: HOSPADM

## 2022-04-01 RX ORDER — OXYTOCIN/0.9 % SODIUM CHLORIDE 30/500 ML
650 PLASTIC BAG, INJECTION (ML) INTRAVENOUS ONCE
Status: COMPLETED | OUTPATIENT
Start: 2022-04-01 | End: 2022-04-01

## 2022-04-01 RX ORDER — IBUPROFEN 800 MG/1
800 TABLET ORAL EVERY 8 HOURS
Status: DISCONTINUED | OUTPATIENT
Start: 2022-04-01 | End: 2022-04-02 | Stop reason: HOSPADM

## 2022-04-01 RX ORDER — CARBOPROST TROMETHAMINE 250 UG/ML
250 INJECTION, SOLUTION INTRAMUSCULAR AS NEEDED
Status: DISCONTINUED | OUTPATIENT
Start: 2022-04-01 | End: 2022-04-01 | Stop reason: HOSPADM

## 2022-04-01 RX ORDER — ONDANSETRON 4 MG/1
4 TABLET, FILM COATED ORAL EVERY 6 HOURS PRN
Status: DISCONTINUED | OUTPATIENT
Start: 2022-04-01 | End: 2022-04-01

## 2022-04-01 RX ORDER — FERROUS SULFATE TAB EC 324 MG (65 MG FE EQUIVALENT) 324 (65 FE) MG
324 TABLET DELAYED RESPONSE ORAL 2 TIMES DAILY WITH MEALS
Status: DISCONTINUED | OUTPATIENT
Start: 2022-04-01 | End: 2022-04-02 | Stop reason: HOSPADM

## 2022-04-01 RX ORDER — OXYTOCIN/0.9 % SODIUM CHLORIDE 30/500 ML
PLASTIC BAG, INJECTION (ML) INTRAVENOUS
Status: COMPLETED
Start: 2022-04-01 | End: 2022-04-01

## 2022-04-01 RX ORDER — IBUPROFEN 800 MG/1
800 TABLET ORAL EVERY 8 HOURS
Status: DISCONTINUED | OUTPATIENT
Start: 2022-04-01 | End: 2022-04-01 | Stop reason: HOSPADM

## 2022-04-01 RX ORDER — CALCIUM CARBONATE 200(500)MG
2 TABLET,CHEWABLE ORAL 3 TIMES DAILY PRN
Status: DISCONTINUED | OUTPATIENT
Start: 2022-04-01 | End: 2022-04-02 | Stop reason: HOSPADM

## 2022-04-01 RX ORDER — PROMETHAZINE HYDROCHLORIDE 25 MG/1
25 TABLET ORAL EVERY 6 HOURS PRN
Status: DISCONTINUED | OUTPATIENT
Start: 2022-04-01 | End: 2022-04-01

## 2022-04-01 RX ORDER — SODIUM CHLORIDE, SODIUM LACTATE, POTASSIUM CHLORIDE, CALCIUM CHLORIDE 600; 310; 30; 20 MG/100ML; MG/100ML; MG/100ML; MG/100ML
125 INJECTION, SOLUTION INTRAVENOUS CONTINUOUS
Status: DISCONTINUED | OUTPATIENT
Start: 2022-04-01 | End: 2022-04-01

## 2022-04-01 RX ORDER — BUTORPHANOL TARTRATE 1 MG/ML
1 INJECTION, SOLUTION INTRAMUSCULAR; INTRAVENOUS
Status: DISCONTINUED | OUTPATIENT
Start: 2022-04-01 | End: 2022-04-01

## 2022-04-01 RX ORDER — BISACODYL 10 MG
10 SUPPOSITORY, RECTAL RECTAL DAILY PRN
Status: DISCONTINUED | OUTPATIENT
Start: 2022-04-02 | End: 2022-04-02 | Stop reason: HOSPADM

## 2022-04-01 RX ORDER — PROMETHAZINE HYDROCHLORIDE 12.5 MG/1
12.5 SUPPOSITORY RECTAL EVERY 6 HOURS PRN
Status: DISCONTINUED | OUTPATIENT
Start: 2022-04-01 | End: 2022-04-01

## 2022-04-01 RX ORDER — METHYLERGONOVINE MALEATE 0.2 MG/ML
200 INJECTION INTRAVENOUS ONCE AS NEEDED
Status: DISCONTINUED | OUTPATIENT
Start: 2022-04-01 | End: 2022-04-01 | Stop reason: HOSPADM

## 2022-04-01 RX ORDER — SODIUM CHLORIDE 0.9 % (FLUSH) 0.9 %
3 SYRINGE (ML) INJECTION EVERY 12 HOURS SCHEDULED
Status: DISCONTINUED | OUTPATIENT
Start: 2022-04-01 | End: 2022-04-01

## 2022-04-01 RX ORDER — SODIUM CHLORIDE 0.9 % (FLUSH) 0.9 %
1-10 SYRINGE (ML) INJECTION AS NEEDED
Status: DISCONTINUED | OUTPATIENT
Start: 2022-04-01 | End: 2022-04-02 | Stop reason: HOSPADM

## 2022-04-01 RX ORDER — ONDANSETRON 2 MG/ML
4 INJECTION INTRAMUSCULAR; INTRAVENOUS EVERY 6 HOURS PRN
Status: DISCONTINUED | OUTPATIENT
Start: 2022-04-01 | End: 2022-04-01

## 2022-04-01 RX ORDER — HYDROCORTISONE 25 MG/G
1 CREAM TOPICAL AS NEEDED
Status: DISCONTINUED | OUTPATIENT
Start: 2022-04-01 | End: 2022-04-02 | Stop reason: HOSPADM

## 2022-04-01 RX ORDER — MISOPROSTOL 100 MCG
50 TABLET ORAL EVERY 6 HOURS
Status: DISCONTINUED | OUTPATIENT
Start: 2022-04-01 | End: 2022-04-01

## 2022-04-01 RX ORDER — LIDOCAINE HYDROCHLORIDE 10 MG/ML
5 INJECTION, SOLUTION EPIDURAL; INFILTRATION; INTRACAUDAL; PERINEURAL AS NEEDED
Status: DISCONTINUED | OUTPATIENT
Start: 2022-04-01 | End: 2022-04-01

## 2022-04-01 RX ORDER — OXYTOCIN/0.9 % SODIUM CHLORIDE 30/500 ML
85 PLASTIC BAG, INJECTION (ML) INTRAVENOUS ONCE
Status: DISCONTINUED | OUTPATIENT
Start: 2022-04-01 | End: 2022-04-01 | Stop reason: SDUPTHER

## 2022-04-01 RX ORDER — ONDANSETRON 2 MG/ML
4 INJECTION INTRAMUSCULAR; INTRAVENOUS EVERY 6 HOURS PRN
Status: DISCONTINUED | OUTPATIENT
Start: 2022-04-01 | End: 2022-04-02 | Stop reason: HOSPADM

## 2022-04-01 RX ORDER — ACETAMINOPHEN 500 MG
1000 TABLET ORAL EVERY 6 HOURS
Status: DISCONTINUED | OUTPATIENT
Start: 2022-04-01 | End: 2022-04-02 | Stop reason: HOSPADM

## 2022-04-01 RX ORDER — PRENATAL VIT/IRON FUM/FOLIC AC 27MG-0.8MG
1 TABLET ORAL DAILY
Status: DISCONTINUED | OUTPATIENT
Start: 2022-04-02 | End: 2022-04-02 | Stop reason: HOSPADM

## 2022-04-01 RX ORDER — DOCUSATE SODIUM 100 MG/1
100 CAPSULE, LIQUID FILLED ORAL 2 TIMES DAILY
Status: DISCONTINUED | OUTPATIENT
Start: 2022-04-01 | End: 2022-04-02 | Stop reason: HOSPADM

## 2022-04-01 RX ORDER — ACETAMINOPHEN 500 MG
1000 TABLET ORAL EVERY 6 HOURS
Status: DISCONTINUED | OUTPATIENT
Start: 2022-04-01 | End: 2022-04-01 | Stop reason: SDUPTHER

## 2022-04-01 RX ORDER — MISOPROSTOL 200 UG/1
800 TABLET ORAL AS NEEDED
Status: DISCONTINUED | OUTPATIENT
Start: 2022-04-01 | End: 2022-04-01 | Stop reason: HOSPADM

## 2022-04-01 RX ORDER — OXYTOCIN/0.9 % SODIUM CHLORIDE 30/500 ML
85 PLASTIC BAG, INJECTION (ML) INTRAVENOUS ONCE
Status: DISCONTINUED | OUTPATIENT
Start: 2022-04-01 | End: 2022-04-02 | Stop reason: HOSPADM

## 2022-04-01 RX ORDER — SODIUM CHLORIDE 0.9 % (FLUSH) 0.9 %
3-10 SYRINGE (ML) INJECTION AS NEEDED
Status: DISCONTINUED | OUTPATIENT
Start: 2022-04-01 | End: 2022-04-01

## 2022-04-01 RX ORDER — ONDANSETRON 4 MG/1
4 TABLET, FILM COATED ORAL EVERY 6 HOURS PRN
Status: DISCONTINUED | OUTPATIENT
Start: 2022-04-01 | End: 2022-04-02 | Stop reason: HOSPADM

## 2022-04-01 RX ORDER — SODIUM CHLORIDE, SODIUM LACTATE, POTASSIUM CHLORIDE, CALCIUM CHLORIDE 600; 310; 30; 20 MG/100ML; MG/100ML; MG/100ML; MG/100ML
INJECTION, SOLUTION INTRAVENOUS
Status: COMPLETED
Start: 2022-04-01 | End: 2022-04-01

## 2022-04-01 RX ADMIN — IBUPROFEN 800 MG: 800 TABLET, FILM COATED ORAL at 20:00

## 2022-04-01 RX ADMIN — ACETAMINOPHEN 1000 MG: 500 TABLET, FILM COATED ORAL at 22:22

## 2022-04-01 RX ADMIN — Medication 650 ML/HR: at 15:12

## 2022-04-01 RX ADMIN — OXYTOCIN-SODIUM CHLORIDE 0.9% IV SOLN 30 UNIT/500ML 650 ML/HR: 30-0.9/5 SOLUTION at 15:12

## 2022-04-01 RX ADMIN — DOCUSATE SODIUM 100 MG: 100 CAPSULE, LIQUID FILLED ORAL at 20:00

## 2022-04-01 RX ADMIN — SODIUM CHLORIDE, SODIUM LACTATE, POTASSIUM CHLORIDE, CALCIUM CHLORIDE 125 ML/HR: 600; 310; 30; 20 INJECTION, SOLUTION INTRAVENOUS at 07:18

## 2022-04-01 RX ADMIN — SODIUM CHLORIDE, POTASSIUM CHLORIDE, SODIUM LACTATE AND CALCIUM CHLORIDE 125 ML/HR: 600; 310; 30; 20 INJECTION, SOLUTION INTRAVENOUS at 07:18

## 2022-04-01 NOTE — INTERVAL H&P NOTE
H&P reviewed. The patient was examined this morning at 0735. She presented at 39w5d in term labor with regular painful contractions and made cervical change to 3 cm from fingertip. She was admitted for expectant management and augmentation of labor if necessary. FHT Cat 1. Declines epidural. GBS negative.    This document has been electronically signed by Jerri Soler MD on April 1, 2022 13:49 CDT.

## 2022-04-01 NOTE — L&D DELIVERY NOTE
[facility]  Vaginal Delivery Note   Review the Delivery Report for details.       Delivery     Delivery: Vaginal, Spontaneous     YOB: 2024    Time of Birth:  Gestational Age 3:01 PM   39w5d     Anesthesia: None     Delivering clinician: Jerri Soler    Forceps?   No   Vacuum? No    Shoulder dystocia present: Yes Shoulder Dystocia Details  Dystocia Present? Yes   Time head delivered: 2022  3:01 PM   Gentle attempt at traction, assisted by maternal expulsive forces: Yes   If no, why:    Anterior shoulder:    Time recognized: 2022  3:01 PM     Time help called:   Called by:     Time provider arrived:   Provider who arrived:     Time NICU arrived: 2022  3:04 PM  NICU staff:     Time additional staff arrived:   Additional staff:            Delivery narrative:    Patient pushed to deliver a viable 3910g female from the NADINE position over an intact perineum via  under epidural anesthesia on 2022 at 3:01PM.  No noted nuchal cord.  Shoulder dystocia was identified.  The patient was instructed to stop pushing.  No fundal pressure was applied.  The right shoulder was anterior.  Manuevers performed included: McRobert's maneuver, suprapubic pressure.  Episiotomy was not indicated.  Traction was put on the head for 6 seconds at 30 degree angle.  The time interval between delivery of the fetal head and body was 6 seconds.  Left posterior shoulder was delivered without difficulty.  Body was then delivered with gentle traction.  Mouth and nose bulb suctioned.  3 vessel cord clamped x2 and cut after 30 seconds of delayed clamping.  Baby was placed on mother's chest.  Cord blood was obtained and sent.  Placenta delivered spontaneously intact and Pitocin was started.  Cervix, vagina, and perineum were examined and a hemostatic 1st degree laceration was noted; no repair indicated.  EBL 250mL; QBL pending, please see nursing documentation.  Apgar scores 6/8.  Mother and infant  Refill request received from pharmacy for lorazepam.    LOV 4/7/2021  Last Fill  4/11/2021  Last Lab 3/21/2021    Script is loaded and is awaiting MD approval.   stable.    Infant    Findings: female  infant     Infant observations: Weight: 3910 g (8 lb 9.9 oz)   Length: 21  in  Observations/Comments:        Apgars: 6  @ 1 minute /    8  @ 5 minutes   Infant Name: Paulina Koch     Placenta, Cord, and Fluid    Placenta delivered  Spontaneous  at   4/1/2022  3:11 PM     Cord: 3 vessels  present.   Nuchal Cord?  no   Cord blood obtained: Yes    Cord gases obtained:  No    Cord gas results: Venous:  No results found for: PHCVEN    Arterial:  No results found for: PHCART     Repair    Episiotomy: None    No    Lacerations: Yes  Laceration Information  Laceration Repaired?   Perineal: 1st  No    Periurethral:       Labial:       Sulcus:       Vaginal:       Cervical:         Suture used for repair: N/A   Estimated Blood Loss: 250 mL           Quantitative Blood Loss:              Complications  none    Disposition  Mother to Mother Baby/Postpartum  in stable condition currently.  Baby to NBN  in stable condition currently.    Jerri Soler MD  04/01/22  17:03 CDT

## 2022-04-01 NOTE — PLAN OF CARE
Problem: Bleeding (Labor)  Goal: Hemostasis  Outcome: Met   Goal Outcome Evaluation:      Spontaneous vaginal delivery at 1501. Shoulder dystocia noted .Recovering at this time.

## 2022-04-02 ENCOUNTER — NURSE TRIAGE (OUTPATIENT)
Dept: CALL CENTER | Facility: HOSPITAL | Age: 22
End: 2022-04-02

## 2022-04-02 VITALS
BODY MASS INDEX: 33.29 KG/M2 | SYSTOLIC BLOOD PRESSURE: 122 MMHG | RESPIRATION RATE: 20 BRPM | OXYGEN SATURATION: 98 % | HEIGHT: 64 IN | DIASTOLIC BLOOD PRESSURE: 72 MMHG | HEART RATE: 100 BPM | WEIGHT: 195 LBS | TEMPERATURE: 98.2 F

## 2022-04-02 LAB
DEPRECATED RDW RBC AUTO: 57.3 FL (ref 37–54)
ERYTHROCYTE [DISTWIDTH] IN BLOOD BY AUTOMATED COUNT: 21.1 % (ref 12.3–15.4)
HCT VFR BLD AUTO: 33.3 % (ref 34–46.6)
HGB BLD-MCNC: 10.5 G/DL (ref 12–15.9)
MCH RBC QN AUTO: 24.3 PG (ref 26.6–33)
MCHC RBC AUTO-ENTMCNC: 31.5 G/DL (ref 31.5–35.7)
MCV RBC AUTO: 77.1 FL (ref 79–97)
PLATELET # BLD AUTO: 223 10*3/MM3 (ref 140–450)
PMV BLD AUTO: 11 FL (ref 6–12)
RBC # BLD AUTO: 4.32 10*6/MM3 (ref 3.77–5.28)
WBC NRBC COR # BLD: 15.39 10*3/MM3 (ref 3.4–10.8)

## 2022-04-02 PROCEDURE — 0503F POSTPARTUM CARE VISIT: CPT | Performed by: STUDENT IN AN ORGANIZED HEALTH CARE EDUCATION/TRAINING PROGRAM

## 2022-04-02 PROCEDURE — 85027 COMPLETE CBC AUTOMATED: CPT | Performed by: OBSTETRICS & GYNECOLOGY

## 2022-04-02 RX ORDER — ACETAMINOPHEN 500 MG
1000 TABLET ORAL EVERY 6 HOURS
Qty: 50 TABLET | Refills: 0 | Status: SHIPPED | OUTPATIENT
Start: 2022-04-02 | End: 2022-05-18

## 2022-04-02 RX ORDER — IBUPROFEN 800 MG/1
800 TABLET ORAL EVERY 8 HOURS
Qty: 40 TABLET | Refills: 0 | Status: SHIPPED | OUTPATIENT
Start: 2022-04-02 | End: 2022-05-18

## 2022-04-02 RX ORDER — PSEUDOEPHEDRINE HCL 30 MG
100 TABLET ORAL 2 TIMES DAILY
Qty: 60 CAPSULE | Refills: 0 | Status: SHIPPED | OUTPATIENT
Start: 2022-04-02 | End: 2022-05-18

## 2022-04-02 RX ADMIN — FERROUS SULFATE TAB EC 324 MG (65 MG FE EQUIVALENT) 324 MG: 324 (65 FE) TABLET DELAYED RESPONSE at 10:54

## 2022-04-02 RX ADMIN — IBUPROFEN 800 MG: 800 TABLET, FILM COATED ORAL at 03:04

## 2022-04-02 RX ADMIN — IBUPROFEN 800 MG: 800 TABLET, FILM COATED ORAL at 10:53

## 2022-04-02 RX ADMIN — ACETAMINOPHEN 1000 MG: 500 TABLET, FILM COATED ORAL at 06:15

## 2022-04-02 RX ADMIN — ACETAMINOPHEN 1000 MG: 500 TABLET, FILM COATED ORAL at 10:53

## 2022-04-02 RX ADMIN — PRENATAL VIT W/ FE FUMARATE-FA TAB 27-0.8 MG 1 TABLET: 27-0.8 TAB at 10:54

## 2022-04-02 RX ADMIN — DOCUSATE SODIUM 100 MG: 100 CAPSULE, LIQUID FILLED ORAL at 10:54

## 2022-04-02 NOTE — PLAN OF CARE
Goal Outcome Evaluation:  Plan of Care Reviewed With: patient, spouse        Progress: improving  Outcome Evaluation: VSS, FF,  voiding, pain well controlled with po meds, positive bonding noted with infant, requesting dc home today.

## 2022-04-02 NOTE — DISCHARGE SUMMARY
[facility]  Discharge Summary  Patient Name: Kenia Koch  : 2000  MRN: [id number]  [facility]: [id number]    Discharge Summary    Date of Admission: 2022   Date of Discharge: 2022    Principle Discharge Dx: Active Hospital Problems    Diagnosis  POA   • **Normal labor [O80, Z37.9]  Not Applicable   • Single liveborn infant delivered vaginally [Z38.00]  No   • Iron deficiency anemia during pregnancy [O99.019, D50.9]  Yes   • Short interval between pregnancies affecting pregnancy, antepartum [O09.899]  Not Applicable   • Supervision of high risk pregnancy in third trimester [O09.93]  Not Applicable     WBC   Date Value Ref Range Status   2022 14.17 (H) 3.40 - 10.80 10*3/mm3 Final     RBC   Date Value Ref Range Status   2022 4.30 3.77 - 5.28 10*6/mm3 Final     Hemoglobin   Date Value Ref Range Status   2022 10.3 (L) 12.0 - 15.9 g/dL Final     Hematocrit   Date Value Ref Range Status   2022 32.9 (L) 34.0 - 46.6 % Final     MCV   Date Value Ref Range Status   2022 76.5 (L) 79.0 - 97.0 fL Final     MCH   Date Value Ref Range Status   2022 24.0 (L) 26.6 - 33.0 pg Final     MCHC   Date Value Ref Range Status   2022 31.3 (L) 31.5 - 35.7 g/dL Final     RDW   Date Value Ref Range Status   2022 13.9 12.3 - 15.4 % Final     RDW-SD   Date Value Ref Range Status   2022 37.5 37.0 - 54.0 fl Final     MPV   Date Value Ref Range Status   2022 9.3 6.0 - 12.0 fL Final     Platelets   Date Value Ref Range Status   2022 265 140 - 450 10*3/mm3 Final     Neutrophil %   Date Value Ref Range Status   2021 70.7 42.7 - 76.0 % Final     Lymphocyte %   Date Value Ref Range Status   2021 18.6 (L) 19.6 - 45.3 % Final     Monocyte %   Date Value Ref Range Status   2021 5.2 5.0 - 12.0 % Final     Eosinophil %   Date Value Ref Range Status   2021 4.6 0.3 - 6.2 % Final     Basophil %   Date Value Ref Range Status   2021 0.6  0.0 - 1.5 % Final     Immature Grans %   Date Value Ref Range Status   2021 0.3 0.0 - 0.5 % Final     Neutrophils, Absolute   Date Value Ref Range Status   2021 6.37 1.70 - 7.00 10*3/mm3 Final     Lymphocytes, Absolute   Date Value Ref Range Status   2021 1.67 0.70 - 3.10 10*3/mm3 Final     Monocytes, Absolute   Date Value Ref Range Status   2021 0.47 0.10 - 0.90 10*3/mm3 Final     Eosinophils, Absolute   Date Value Ref Range Status   2021 0.41 (H) 0.00 - 0.40 10*3/mm3 Final     Basophils, Absolute   Date Value Ref Range Status   2021 0.05 0.00 - 0.20 10*3/mm3 Final     Immature Grans, Absolute   Date Value Ref Range Status   2021 0.03 0.00 - 0.05 10*3/mm3 Final     nRBC   Date Value Ref Range Status   2021 0.0 0.0 - 0.2 /100 WBC Final        • Shoulder dystocia during labor and delivery [O66.0]  Yes     McRobert's and suprapubic pressure        Procedures Performed:  complicated by 6 second shoulder dystocia   Brief History: Patient is a 21 y.o. now  who presented to labor and delivery for elective IOL.   Hospital Course: Patient presented for elective IOL, progressed well without significant incident.  She had a  complicated by 6 second shoulder dystocia.  Her postpartum course was unremarkable.  On PPD #1 she expressed the desire for discharge.  She had passed gas and was urinating normally.  She was eating a regular diet without difficulty.  She was ambulating well.  Discharge instructions were given.  Breastfeeding going well. Declined contraception at this time, desiring to discuss with Dr. Soler postpartum. All questions were answered    Vitals:    22 1955 22 0611 22 1048 22 1407   BP: 116/62 101/56 118/59 122/72   BP Location: Right arm Right arm Right arm Right arm   Patient Position: Lying Lying Lying Lying   Pulse: 90 86 97 100   Resp: 16 18 18 20   Temp: 98.6 °F (37 °C) 97.6 °F (36.4 °C) 98 °F (36.7 °C) 98.2 °F (36.8  °C)   TempSrc: Oral Oral Oral Oral   SpO2: 96% 96% 98% 98%   Weight:       Height:         Gen: well appearing, NAD  CV: RRR  Chest: no increased work of breathing  Abd: soft, appropriately tender, nondistended, fundus firm and below umbilicus  Ext: nontender, no edema     Condition:  Discharge Activity:  Discharge Diet: Stable  Activity Instructions     Bathing Restrictions      Type of Restriction: Bathing    Bathing Restrictions: Other    Explain Bathing Restrictions: No soaking in bathtub for 4 weeks. Showers are fine.    Driving Restrictions      Type of Restriction: Driving    Driving Restrictions: No Driving (Time Limited)    Length: Other    Indicate Length of Restriction: No driving for 1 week or if using narcotic pain medications. Riding is car is fine.    Lifting Restrictions      Type of Restriction: Lifting    Lifting Restrictions: Other    Explain Lifing Restrictions: No lifting more than infant and baby carrier together for 6 weeks.    Pelvic Rest      Nothing in the vagina for 6 weeks to include tampons, intercourse, or douching.    Sexual Activity Restrictions      Type of Restriction: Sex    Explain Sexual Activity Restrictions: No sexual intercourse for at least 6 weeks        Regular   Discharge Medications:    Your medication list      START taking these medications      Instructions Last Dose Given Next Dose Due   acetaminophen 500 MG tablet  Commonly known as: TYLENOL  Notes to patient: Last dose given at 1053 0n 4-2-22, next dose due on or after 4:53 on 4-2-22 for pain      Take 2 tablets by mouth Every 6 (Six) Hours.       docusate sodium 100 MG capsule  Notes to patient: Last dose given at 1054 0n 4-2-22, next dose due 4-2-22  At 9:00pm      Take 1 capsule by mouth 2 (Two) Times a Day.       ibuprofen 800 MG tablet  Commonly known as: ADVILMOTRIN  Notes to patient: Last dose given 4-2-22 at 1053 next dose due on 4-2-22  On or after 6:53 pm      Take 1 tablet by mouth Every 8 (Eight)  Hours.          CONTINUE taking these medications      Instructions Last Dose Given Next Dose Due   ferrous sulfate 325 (65 FE) MG tablet  Notes to patient: Last dose given 4-2-22 at 1054 next dose due on 4-2-22 at 9:00 pm      Take 1 tablet by mouth 2 (Two) Times a Day.       PRENATAL GUMMY VITAMIN  Notes to patient: Last dose given 4-2-22 at 1053 next dose due on 4-3-22  At 9:00 in morning      Chew 1 each Daily.          STOP taking these medications    triamcinolone 0.5 % cream  Commonly known as: KENALOG              Where to Get Your Medications      These medications were sent to [facility] - [place], [place] - [address] [place] [address] - [phone]  - [phone] [address] [address]    Phone: [phone] ·   acetaminophen 500 MG tablet  · docusate sodium 100 MG capsule  · ibuprofen 800 MG tablet        Discharge Disposition: Home   Follow-up: No future appointments.  2 week PP visit (telephone)  6 week PP visit     <30 minutes was spent with the patient on the day of discharge.      This document has been electronically signed by Izabella Mccarthy DO on April 4, 2022 22:37 CDT

## 2022-04-02 NOTE — DISCHARGE INSTRUCTIONS
Notify Physician or CNM of heavy bleeding, passing clots or foul odor to your discharge  Temp above 100.4. Burning on urination, gapping or drainage from incision or episiotomy, pain not relieved by your pain meds, redness or streaking in your breast or pain in your legs.  Take medication as prescribed  Continue taking your iron or vitamins till your refills run out or as long as you are breastfeeding  Take several rest periods during the day  Baby blues are normal, and may be present around the 3rd-4th day, if they last longer than 2-3 days contact your physician.  Pelvic Rest- No douching tampons or intercourse for 6 weeks  No lifting anything heavier than the baby  Wear a good supportive bra 24 hrs a day to prevent engorgement.  No driving for 1 week or as long as you are taking narcotic pain meds

## 2022-04-03 NOTE — TELEPHONE ENCOUNTER
"Caller states has large purple colored sac hanging from vaginal area. Caller states vaginal bleeding has been mild. Caller states only some cramping. Caller denies dizziness or feeling lightheaded. Caller has number for her OB-GYN and is going to call now. Caller advised if not able to reach OB-GYN then should be seen in ER for evaluation. Caller advised to call back as needed.     Reason for Disposition  • Patient sounds very sick or weak to the triager    Additional Information  • Negative: Shock suspected (e.g., cold/pale/clammy skin, too weak to stand, low BP, rapid pulse)  • Negative: Difficult to awaken or acting confused (e.g., disoriented, slurred speech)  • Negative: Passed out (i.e., lost consciousness, collapsed and was not responding)  • Negative: Sounds like a life-threatening emergency to the triager  • Negative: SEVERE dizziness (e.g., unable to stand, requires support to walk, feels like passing out now)  • Negative: [1] SEVERE abdominal pain AND [2] present > 1 hour  • Negative: Fever > 100.4 F (38.0 C)  • Negative: SEVERE vaginal bleeding (e.g., soaking 2 pads per hour and present 2 or more hours; large blood clots)    Answer Assessment - Initial Assessment Questions  1. ONSET: \"Describe your bleeding: is it getting worse, staying the same, improving, or stopping and starting?\"      States dark purple colored sac vaginal area   2. AMOUNT: \"How much bleeding are you having today?\"      - SPOTTING: spotting, or pinkish / brownish mucous discharge; does not fill panti-liner or pad     - MILD:  less than 1 pad / hour; less than patient's usual menstrual bleeding    - MODERATE: 1-2 pads / hour; 1 menstrual cup every 6 hours; small-medium blood clots (e.g., pea, grape, small coin)    - SEVERE: soaking 2 or more pads/hour for 2 or more hours; 1 menstrual cup every 2 hours; bleeding not contained by pads or continuous red blood from vagina; large blood clots (e.g., golf ball, large coin)         Mild   3. " "ABDOMINAL PAIN: \"Do you have any pain?\" \"How bad is the pain?\" \"What does it keep you from doing?\"      - MILD -  doesn't interfere with normal activities, abdomen soft and not tender to touch      - MODERATE - interferes with normal activities or awakens from sleep, tender to touch      - SEVERE - excruciating pain, doubled over, unable to do any normal activities        Some cramping   4. HORMONES: \"Are you taking any birth control medications?\" (e.g., birth control pills, Depo-Provera)        5. BLOOD THINNERS: \"Do you take any blood thinners?\" (e.g., coumadin, aspirin)        6. OTHER SYMPTOMS: \"Do you have any other symptoms?\" (e.g., fever, chills, dizziness)      Denies fever, chills and dizziness   7. DELIVERY DATE: \"When was your delivery date?\" \"Vaginal delivery or ?\"      Vaginal birth day ago   8. BREASTFEEDING: \"Are you breastfeeding?\"      Yes    Protocols used: POSTPARTUM - VAGINAL BLEEDING AND LOCHIA-ADULT-AH      "

## 2022-05-18 ENCOUNTER — OFFICE VISIT (OUTPATIENT)
Dept: OBSTETRICS AND GYNECOLOGY | Facility: CLINIC | Age: 22
End: 2022-05-18

## 2022-05-18 ENCOUNTER — LAB (OUTPATIENT)
Dept: LAB | Facility: OTHER | Age: 22
End: 2022-05-18

## 2022-05-18 VITALS
HEART RATE: 86 BPM | HEIGHT: 64 IN | WEIGHT: 172.6 LBS | BODY MASS INDEX: 29.47 KG/M2 | DIASTOLIC BLOOD PRESSURE: 68 MMHG | SYSTOLIC BLOOD PRESSURE: 110 MMHG

## 2022-05-18 DIAGNOSIS — D50.9 IRON DEFICIENCY ANEMIA, UNSPECIFIED IRON DEFICIENCY ANEMIA TYPE: ICD-10-CM

## 2022-05-18 PROBLEM — Z37.9 NORMAL LABOR: Status: RESOLVED | Noted: 2022-04-01 | Resolved: 2022-05-18

## 2022-05-18 PROBLEM — O09.899 SHORT INTERVAL BETWEEN PREGNANCIES AFFECTING PREGNANCY, ANTEPARTUM: Status: RESOLVED | Noted: 2021-10-22 | Resolved: 2022-05-18

## 2022-05-18 PROBLEM — O09.93 SUPERVISION OF HIGH RISK PREGNANCY IN THIRD TRIMESTER: Status: RESOLVED | Noted: 2021-10-22 | Resolved: 2022-05-18

## 2022-05-18 PROBLEM — O99.019 IRON DEFICIENCY ANEMIA DURING PREGNANCY: Status: RESOLVED | Noted: 2022-01-05 | Resolved: 2022-05-18

## 2022-05-18 LAB
BASOPHILS # BLD AUTO: 0.06 10*3/MM3 (ref 0–0.2)
BASOPHILS NFR BLD AUTO: 0.7 % (ref 0–1.5)
DEPRECATED RDW RBC AUTO: 45 FL (ref 37–54)
EOSINOPHIL # BLD AUTO: 0.75 10*3/MM3 (ref 0–0.4)
EOSINOPHIL NFR BLD AUTO: 8.6 % (ref 0.3–6.2)
ERYTHROCYTE [DISTWIDTH] IN BLOOD BY AUTOMATED COUNT: 15.3 % (ref 12.3–15.4)
HCT VFR BLD AUTO: 37.2 % (ref 34–46.6)
HGB BLD-MCNC: 12.1 G/DL (ref 12–15.9)
LYMPHOCYTES # BLD AUTO: 2.02 10*3/MM3 (ref 0.7–3.1)
LYMPHOCYTES NFR BLD AUTO: 23.2 % (ref 19.6–45.3)
MCH RBC QN AUTO: 26.5 PG (ref 26.6–33)
MCHC RBC AUTO-ENTMCNC: 32.5 G/DL (ref 31.5–35.7)
MCV RBC AUTO: 81.6 FL (ref 79–97)
MONOCYTES # BLD AUTO: 0.59 10*3/MM3 (ref 0.1–0.9)
MONOCYTES NFR BLD AUTO: 6.8 % (ref 5–12)
NEUTROPHILS NFR BLD AUTO: 5.29 10*3/MM3 (ref 1.7–7)
NEUTROPHILS NFR BLD AUTO: 60.7 % (ref 42.7–76)
PLATELET # BLD AUTO: 258 10*3/MM3 (ref 140–450)
PMV BLD AUTO: 10.9 FL (ref 6–12)
RBC # BLD AUTO: 4.56 10*6/MM3 (ref 3.77–5.28)
WBC NRBC COR # BLD: 8.71 10*3/MM3 (ref 3.4–10.8)

## 2022-05-18 PROCEDURE — 36415 COLL VENOUS BLD VENIPUNCTURE: CPT | Performed by: NURSE PRACTITIONER

## 2022-05-18 PROCEDURE — 0503F POSTPARTUM CARE VISIT: CPT | Performed by: NURSE PRACTITIONER

## 2022-05-18 PROCEDURE — 85025 COMPLETE CBC W/AUTO DIFF WBC: CPT | Performed by: NURSE PRACTITIONER

## 2022-05-18 RX ORDER — DESOGESTREL AND ETHINYL ESTRADIOL 0.15-0.03
1 KIT ORAL DAILY
Qty: 28 TABLET | Refills: 3 | Status: SHIPPED | OUTPATIENT
Start: 2022-05-18 | End: 2022-09-14 | Stop reason: SDUPTHER

## 2022-05-18 NOTE — PROGRESS NOTES
Postpartum visit      Kenia Koch is a 21 y.o.  s/p  Vaginal, spontaneous on 22 at 39w5d secondary to EIOL who presents today for postpartum check.  The patient states she is dong well.  Patient denies postpartum depression.  Menstrual cycles have resumed on 22  Bottle feeding.  Desires OCP for contraception.  She has resumed sexual intercourse.  Denies bowel or bladder issues.    PHYSICAL EXAM:    LMP  (LMP Unknown)   Heart Lungs: WNL  Abdomen: +BS, benign, no masses, soft, non-tender.  Bimanual exam: Pt requests deferral today   Extremities: No deep calf tenderness.  Postpartum Depression Screening Questionnaire: 0, No treatment indicated.    IMPRESSION/PLAN: Kenia Koch is a 21 y.o.  s/p Vaginal, Spontaneous on 22.  Doing well.   - Recovered nicely from her delivery  - Contraception: OCP. She was instructed how to start her birth control.  It should be started on  following the onset of her next cycle.  Because it may take 1 month to become effective, the use of alternative contraception for one month was stressed.  The potential for breakthrough bleeding for up to 3 cycles was also emphasized. Discussed what to do if 1 and 2 or more days of pills are missed. Patient verbalizes understanding. All questions were answered.   -Recheck CBC today  - RTC in 3 months for annual gynecological exam and FU on OCP  - Return to normal physical activity    This document has been electronically signed by KELLI Go on May 18, 2022 14:43 CDT.

## 2022-08-17 RX ORDER — DESOGESTREL AND ETHINYL ESTRADIOL 0.15-0.03
KIT ORAL
Qty: 28 TABLET | Refills: 3 | OUTPATIENT
Start: 2022-08-17

## 2022-09-14 RX ORDER — DESOGESTREL AND ETHINYL ESTRADIOL 0.15-0.03
KIT ORAL
Qty: 28 TABLET | Refills: 0 | Status: SHIPPED | OUTPATIENT
Start: 2022-09-14 | End: 2022-09-21 | Stop reason: SDUPTHER

## 2022-09-21 ENCOUNTER — LAB (OUTPATIENT)
Dept: LAB | Facility: OTHER | Age: 22
End: 2022-09-21

## 2022-09-21 ENCOUNTER — OFFICE VISIT (OUTPATIENT)
Dept: OBSTETRICS AND GYNECOLOGY | Facility: CLINIC | Age: 22
End: 2022-09-21

## 2022-09-21 VITALS
HEART RATE: 87 BPM | WEIGHT: 164.4 LBS | HEIGHT: 64 IN | SYSTOLIC BLOOD PRESSURE: 112 MMHG | BODY MASS INDEX: 28.07 KG/M2 | DIASTOLIC BLOOD PRESSURE: 74 MMHG

## 2022-09-21 DIAGNOSIS — Z30.41 ORAL CONTRACEPTIVE PILL SURVEILLANCE: ICD-10-CM

## 2022-09-21 DIAGNOSIS — Z01.419 ENCOUNTER FOR GYNECOLOGICAL EXAMINATION WITH PAPANICOLAOU SMEAR OF CERVIX: Primary | ICD-10-CM

## 2022-09-21 PROCEDURE — 99395 PREV VISIT EST AGE 18-39: CPT | Performed by: NURSE PRACTITIONER

## 2022-09-21 RX ORDER — TRIAMCINOLONE ACETONIDE 1 MG/G
CREAM TOPICAL
COMMUNITY
Start: 2022-09-14

## 2022-09-21 RX ORDER — DESONIDE 0.5 MG/G
CREAM TOPICAL
COMMUNITY
Start: 2022-09-12

## 2022-09-21 RX ORDER — DESOGESTREL AND ETHINYL ESTRADIOL 0.15-0.03
1 KIT ORAL DAILY
Qty: 28 TABLET | Refills: 12 | Status: SHIPPED | OUTPATIENT
Start: 2022-09-21

## 2022-09-21 NOTE — PROGRESS NOTES
Subjective   Chief Complaint   Patient presents with   • Gynecologic Exam     WWE   • Contraception     Refill OCP       Kenia Koch is a 21 y.o. year old  presenting to be seen for her annual exam.  Today she needs OCP refills. Denies any concerns.      Patient's last menstrual period was 2022 (exact date).     OB History        2    Para   2    Term   2       0    AB   0    Living   2       SAB   0    IAB   0    Ectopic   0    Molar   0    Multiple   0    Live Births   2                Current birth control method: OCP    She is sexually active.  In the past 12 months there has not been new sexual partners.  Condoms are not typically used.  She would not like to be screened for STD's at today's exam.     Past 6 month menstrual history: :    Cycle Frequency: regular, predictable and consistent every 28 - 32 days   Menstrual cycle character: flow is typically normal   Cycle Duration: 3 - 4   Number of heavy days of flows: 0   Dysmenorrhea: moderate and is not affecting her activities of daily living   PMS: none   Intermenstrual bleeding present: no   Post-coital bleeding present: no     She exercises regularly: yes.  She wears her seat belt:yes.  She has concerns about domestic violence: no.  Last colonoscopy: Never  Last DEXA: Never  Last MMG: Never  Last pap:  2020  History of abnormal PAP: No    The following portions of the patient's history were reviewed and updated as appropriate:problem list, current medications, allergies, past family history, past medical history, past social history and past surgical history.    Social History    Tobacco Use      Smoking status: Never Smoker      Smokeless tobacco: Never Used    Social History     Substance and Sexual Activity   Alcohol Use No      Review of Systems   Constitutional: Negative.  Negative for chills and fever.   Respiratory: Negative.    Cardiovascular: Negative.    Gastrointestinal: Negative.  Negative for abdominal  "pain, constipation, diarrhea, nausea and vomiting.   Endocrine: Negative.    Genitourinary: Negative.  Negative for dysuria, menstrual problem, pelvic pain, vaginal bleeding, vaginal discharge and vaginal pain.   Skin:        Eczema    Neurological: Negative for dizziness, syncope, light-headedness and headaches.   Psychiatric/Behavioral: Negative for suicidal ideas. The patient is not nervous/anxious.          Objective   /74   Pulse 87   Ht 162.6 cm (64\")   Wt 74.6 kg (164 lb 6.4 oz)   LMP 09/14/2022 (Exact Date)   Breastfeeding No   BMI 28.22 kg/m²     General:  well developed; well nourished  no acute distress   Skin:  No suspicious lesions seen   Eczema present    Thyroid: normal to inspection and palpation   Breasts:  Examined in supine position  Symmetric without masses or skin dimpling  Nipples normal without inversion, lesions or discharge  There are no palpable axillary nodes   Abdomen: soft, non-tender; no masses  no umbilical or inguinal hernias are present  no hepato-splenomegaly  Normal findings: soft, non-tender   Cardiac: Heart sounds are normal.  Regular rate and rhythm without murmur, gallop or rub.   Resp: Normal expansion.  Clear to auscultation.  No rales, rhonchi, or wheezing.   Psych: alert,oriented, in NAD with a full range of affect, normal behavior and no psychotic features   Pelvis: Uterus:  Clinical staff was present for exam  External genitalia:  normal appearance of the external genitalia including Bartholin's and Deshler's glands.  :  urethral meatus normal;  Vaginal:  normal pink mucosa without prolapse or lesions  Cervix:  normal appearance.  Uterus:  normal size, shape and consistency  Adnexa:  normal bimanual exam of the adnexa.  Rectal:  digital rectal exam not performed; anus visually normal appearing.     Lab Review   No data reviewed    Imaging  No data reviewed       Diagnoses and all orders for this visit:    1. Encounter for gynecological examination with " Papanicolaou smear of cervix (Primary)  -     LIQUID-BASED PAP SMEAR, [facility] ([facility])    2. Oral contraceptive pill surveillance  -     desogestrel-ethinyl estradiol (Juleber) 0.15-30 MG-MCG per tablet; Take 1 tablet by mouth Daily.  Dispense: 28 tablet; Refill: 12    Pap results: I will send card in mail or call if abnormal. RTC annually for WWE or sooner PRN.     Uses OCP as contraceptive. Periods are well managed.      and monogamous. Declines STI testing.     SBE encouraged monthly.     May want to TTC again in the semi near future.     Is followed by allergist for eczema and is considering future treatments.     This note was electronically signed.    Naomi Santiago, APRN  September 21, 2022

## 2022-09-27 LAB — REF LAB TEST METHOD: NORMAL

## 2023-04-19 ENCOUNTER — LAB (OUTPATIENT)
Dept: LAB | Facility: OTHER | Age: 23
End: 2023-04-19
Payer: COMMERCIAL

## 2023-04-19 ENCOUNTER — OFFICE VISIT (OUTPATIENT)
Dept: OBSTETRICS AND GYNECOLOGY | Facility: CLINIC | Age: 23
End: 2023-04-19
Payer: COMMERCIAL

## 2023-04-19 VITALS
HEART RATE: 94 BPM | HEIGHT: 64 IN | DIASTOLIC BLOOD PRESSURE: 62 MMHG | SYSTOLIC BLOOD PRESSURE: 110 MMHG | WEIGHT: 158.2 LBS | BODY MASS INDEX: 27.01 KG/M2

## 2023-04-19 DIAGNOSIS — Z32.01 PREGNANCY TEST-POSITIVE: Primary | ICD-10-CM

## 2023-04-19 DIAGNOSIS — Z3A.09 9 WEEKS GESTATION OF PREGNANCY: ICD-10-CM

## 2023-04-19 LAB
ABO GROUP BLD: NORMAL
AMPHET+METHAMPHET UR QL: NEGATIVE
AMPHETAMINES UR QL: NEGATIVE
BACTERIA UR QL AUTO: ABNORMAL /HPF
BARBITURATES UR QL SCN: NEGATIVE
BENZODIAZ UR QL SCN: NEGATIVE
BILIRUB UR QL STRIP: NEGATIVE
BLD GP AB SCN SERPL QL: NEGATIVE
BUPRENORPHINE SERPL-MCNC: NEGATIVE NG/ML
CANNABINOIDS SERPL QL: NEGATIVE
CLARITY UR: CLEAR
COCAINE UR QL: NEGATIVE
COLOR UR: YELLOW
GLUCOSE UR STRIP-MCNC: NEGATIVE MG/DL
HGB UR QL STRIP.AUTO: NEGATIVE
HYALINE CASTS UR QL AUTO: ABNORMAL /LPF
KETONES UR QL STRIP: NEGATIVE
LEUKOCYTE ESTERASE UR QL STRIP.AUTO: NEGATIVE
Lab: NORMAL
METHADONE UR QL SCN: NEGATIVE
NITRITE UR QL STRIP: NEGATIVE
OPIATES UR QL: NEGATIVE
OXYCODONE UR QL SCN: NEGATIVE
PCP UR QL SCN: NEGATIVE
PH UR STRIP.AUTO: 6 [PH] (ref 5.5–8)
PROPOXYPH UR QL: NEGATIVE
PROT UR QL STRIP: NEGATIVE
RBC # UR STRIP: ABNORMAL /HPF
REF LAB TEST METHOD: ABNORMAL
RH BLD: POSITIVE
SP GR UR STRIP: >=1.03 (ref 1–1.03)
SQUAMOUS #/AREA URNS HPF: ABNORMAL /HPF
TRICYCLICS UR QL SCN: NEGATIVE
UROBILINOGEN UR QL STRIP: NORMAL
WBC # UR STRIP: ABNORMAL /HPF

## 2023-04-19 PROCEDURE — 87491 CHLMYD TRACH DNA AMP PROBE: CPT | Performed by: NURSE PRACTITIONER

## 2023-04-19 PROCEDURE — 86803 HEPATITIS C AB TEST: CPT | Performed by: NURSE PRACTITIONER

## 2023-04-19 PROCEDURE — 80306 DRUG TEST PRSMV INSTRMNT: CPT | Performed by: NURSE PRACTITIONER

## 2023-04-19 PROCEDURE — 87661 TRICHOMONAS VAGINALIS AMPLIF: CPT | Performed by: NURSE PRACTITIONER

## 2023-04-19 PROCEDURE — 84443 ASSAY THYROID STIM HORMONE: CPT | Performed by: NURSE PRACTITIONER

## 2023-04-19 PROCEDURE — 80081 OBSTETRIC PANEL INC HIV TSTG: CPT | Performed by: NURSE PRACTITIONER

## 2023-04-19 PROCEDURE — 87086 URINE CULTURE/COLONY COUNT: CPT | Performed by: NURSE PRACTITIONER

## 2023-04-19 PROCEDURE — 87591 N.GONORRHOEAE DNA AMP PROB: CPT | Performed by: NURSE PRACTITIONER

## 2023-04-19 PROCEDURE — 84702 CHORIONIC GONADOTROPIN TEST: CPT | Performed by: NURSE PRACTITIONER

## 2023-04-20 LAB
BASOPHILS # BLD AUTO: 0.06 10*3/MM3 (ref 0–0.2)
BASOPHILS NFR BLD AUTO: 0.6 % (ref 0–1.5)
C TRACH RRNA CVX QL NAA+PROBE: NEGATIVE
DEPRECATED RDW RBC AUTO: 38.6 FL (ref 37–54)
EOSINOPHIL # BLD AUTO: 0.38 10*3/MM3 (ref 0–0.4)
EOSINOPHIL NFR BLD AUTO: 3.5 % (ref 0.3–6.2)
ERYTHROCYTE [DISTWIDTH] IN BLOOD BY AUTOMATED COUNT: 12.8 % (ref 12.3–15.4)
HBV SURFACE AG SERPL QL IA: NORMAL
HCG INTACT+B SERPL-ACNC: NORMAL MIU/ML
HCT VFR BLD AUTO: 36.6 % (ref 34–46.6)
HCV AB SER DONR QL: NORMAL
HGB BLD-MCNC: 12.3 G/DL (ref 12–15.9)
HIV1+2 AB SER QL: NORMAL
IMM GRANULOCYTES # BLD AUTO: 0.04 10*3/MM3 (ref 0–0.05)
IMM GRANULOCYTES NFR BLD AUTO: 0.4 % (ref 0–0.5)
LYMPHOCYTES # BLD AUTO: 2.11 10*3/MM3 (ref 0.7–3.1)
LYMPHOCYTES NFR BLD AUTO: 19.4 % (ref 19.6–45.3)
MCH RBC QN AUTO: 27.5 PG (ref 26.6–33)
MCHC RBC AUTO-ENTMCNC: 33.6 G/DL (ref 31.5–35.7)
MCV RBC AUTO: 81.9 FL (ref 79–97)
MONOCYTES # BLD AUTO: 0.67 10*3/MM3 (ref 0.1–0.9)
MONOCYTES NFR BLD AUTO: 6.2 % (ref 5–12)
N GONORRHOEA RRNA SPEC QL NAA+PROBE: NEGATIVE
NEUTROPHILS NFR BLD AUTO: 69.9 % (ref 42.7–76)
NEUTROPHILS NFR BLD AUTO: 7.59 10*3/MM3 (ref 1.7–7)
NRBC BLD AUTO-RTO: 0 /100 WBC (ref 0–0.2)
PLATELET # BLD AUTO: 254 10*3/MM3 (ref 140–450)
PMV BLD AUTO: 11.6 FL (ref 6–12)
RBC # BLD AUTO: 4.47 10*6/MM3 (ref 3.77–5.28)
RPR SER QL: NORMAL
TRICHOMONAS VAGINALIS PCR: NEGATIVE
TSH SERPL DL<=0.05 MIU/L-ACNC: 0.98 UIU/ML (ref 0.27–4.2)
WBC NRBC COR # BLD: 10.85 10*3/MM3 (ref 3.4–10.8)

## 2023-04-21 ENCOUNTER — INITIAL PRENATAL (OUTPATIENT)
Dept: OBSTETRICS AND GYNECOLOGY | Facility: CLINIC | Age: 23
End: 2023-04-21
Payer: COMMERCIAL

## 2023-04-21 VITALS — SYSTOLIC BLOOD PRESSURE: 108 MMHG | WEIGHT: 160.4 LBS | DIASTOLIC BLOOD PRESSURE: 60 MMHG | BODY MASS INDEX: 27.53 KG/M2

## 2023-04-21 DIAGNOSIS — Z3A.09 9 WEEKS GESTATION OF PREGNANCY: ICD-10-CM

## 2023-04-21 DIAGNOSIS — Z34.81 ENCOUNTER FOR SUPERVISION OF OTHER NORMAL PREGNANCY IN FIRST TRIMESTER: Primary | ICD-10-CM

## 2023-04-21 LAB
B-HCG UR QL: POSITIVE
BACTERIA SPEC AEROBE CULT: NORMAL
EXPIRATION DATE: ABNORMAL
INTERNAL NEGATIVE CONTROL: ABNORMAL
INTERNAL POSITIVE CONTROL: ABNORMAL
Lab: ABNORMAL
RUBV IGG SERPL IA-ACNC: 1.67 INDEX

## 2023-04-21 PROCEDURE — 0501F PRENATAL FLOW SHEET: CPT | Performed by: NURSE PRACTITIONER

## 2023-04-21 NOTE — PROGRESS NOTES
Subjective   Kenia Koch is a 22 y.o. female.     History of Present Illness   Pt presents for confirmation of pregnancy. Patient's last menstrual period was 02/15/2023 (within days). GA 9w0d.  YUMI 21. . Hx of short interval pregnancies, last delivery was 22. SHRUTI but no other major complications in pregnancy.     The following portions of the patient's history were reviewed and updated as appropriate: allergies, current medications, past family history, past medical history, past social history, past surgical history and problem list.    Review of Systems   Constitutional: Negative.  Negative for chills and fever.   Respiratory: Negative.    Cardiovascular: Negative.    Gastrointestinal: Negative for nausea and vomiting.   Genitourinary: Negative.  Negative for pelvic pain and vaginal bleeding.   Skin:        Eczema    Neurological: Negative for dizziness, syncope and light-headedness.       Objective   Physical Exam  Vitals reviewed.   Constitutional:       General: She is not in acute distress.     Appearance: Normal appearance. She is not ill-appearing.   Cardiovascular:      Rate and Rhythm: Normal rate and regular rhythm.      Heart sounds: Normal heart sounds.   Pulmonary:      Effort: Pulmonary effort is normal.      Breath sounds: Normal breath sounds.   Skin:     General: Skin is warm and dry.   Neurological:      Mental Status: She is alert.   Psychiatric:         Mood and Affect: Mood normal.         Behavior: Behavior normal.           Assessment & Plan   Diagnoses and all orders for this visit:    1. Pregnancy test-positive (Primary)  -     Chlamydia trachomatis, Neisseria gonorrhoeae, Trichomonas vaginalis, PCR - Urine, Urine, Clean Catch  -     hCG, Quantitative, Pregnancy  -     Cancel: Hepatitis C Antibody  -     OB Panel With HIV  -     POC Pregnancy, Urine  -     TSH  -     Urinalysis With Microscopic - Urine, Clean Catch  -     Urine Culture - Urine, Urine, Clean Catch  -      Urine Drug Screen - Urine, Clean Catch  -     US Ob Transvaginal; Future  -     PREVIOUS HISTORY    2. 9 weeks gestation of pregnancy        A newob bag is given. The 1st trimester teaching was done with the patient. We discussed a healthy diet and exercise and what is recommended. She is taking a prenatal vitamin. We also discussed Listeriosis and Toxoplasmosis.  I informed patient not to be in hot tubs, saunas, or tanning beds. We discussed that spotting may occur after intercourse which is common, but if heavy bleeding like a period occurs to call [facility] or hospital if clinic is closed.  I encouraged her to make an appointment with the dentist if she has not had a dental exam and cleaning in the last 6 months. She plans to breastfeed. I encouraged the patient to get the TDAP vaccine in the 3rd trimester.  I discussed with the patient that a pediatrician needs to be chosen prior to delivery for the infant to have an appointment scheduled before leaving the hospital.  I discussed lab tests will be done today. All questions were answered at this time.     RTC Friday for 1T dating US and initiating New OB visit.

## 2023-04-23 PROBLEM — Z34.90 SUPERVISION OF NORMAL PREGNANCY: Status: ACTIVE | Noted: 2023-04-23

## 2023-04-23 NOTE — PROGRESS NOTES
[facility]  Obstetrics  Date of Service: 2023    CHIEF COMPLAINT:  New prenatal visit    HISTORY OF PRESENT ILLNESS:  Kenia Koch is a 22 y.o. y/o  at 9w4d by LMP (Patient's last menstrual period was 02/15/2023 (within days).).  This was a planned pregnancy and the patient is supported by her .  Reports no nausea or vomiting.  Reports breast tenderness.  She denies any vaginal bleeding.  She has started taking a prenatal vitamin.    REVIEW OF SYSTEMS  Review of Systems   Constitutional: Negative.  Negative for chills and fever.   Respiratory: Negative.    Cardiovascular: Negative.    Gastrointestinal: Negative for nausea and vomiting.   Genitourinary: Negative.  Negative for dysuria, genital sores, pelvic pain and vaginal bleeding.   Skin:        Eczema    Neurological: Negative for dizziness, syncope and light-headedness.   Psychiatric/Behavioral: Negative.  Negative for depressed mood. The patient is not nervous/anxious.        PRENATAL RISK FACTORS   Problems (from 23 to present)     No problems associated with this episode.          DATING CRITERIA:  LMP (2/15/23) -- YUMI 23  1TUS (23 at 8w4d) -- YUMI 23    OBSTETRIC HISTORY:  OB History    Para Term  AB Living   3 2 2 0 0 2   SAB IAB Ectopic Molar Multiple Live Births   0 0 0 0 0 2      # Outcome Date GA Lbr Yordy/2nd Weight Sex Delivery Anes PTL Lv   3 Current            2 Term 22 39w5d 08:36 / 00:11 3910 g (8 lb 9.9 oz) F Vag-Spont None N ARIANA      Complications: Shoulder Dystocia   1 Term 19 41w1d 07:07 / 00:19 4020 g (8 lb 13.8 oz) F Vag-Spont None N ARIANA     GYN HISTORY:  Denies h/o sexually transmitted infections/pelvic inflammatory disease  Denies h/o abnormal pap smears  Last pap smear:   Last Completed Pap Smear          PAP SMEAR (Every 3 Years) Next due on 2022  LIQUID-BASED PAP SMEAR, [facility] ([facility])    2020   Liquid-based Pap Smear, Screening              Denies h/o gynecologic surgeries, including biopsies of the cervix    PAST MEDICAL HISTORY:  History reviewed. No pertinent past medical history.  PAST SURGICAL HISTORY:  Past Surgical History:   Procedure Laterality Date   • ADENOIDECTOMY     • TONSILLECTOMY     • WISDOM TOOTH EXTRACTION       FAMILY HISTORY:  Family History   Problem Relation Age of Onset   • Hypertension Father    • No Known Problems Mother    • Hypertension Paternal Grandfather    • Heart block Paternal Grandfather    • Arthritis Paternal Grandfather    • ALS Paternal Grandmother    • Hypertension Paternal Grandmother    • No Known Problems Maternal Grandmother    • No Known Problems Half-Brother    • No Known Problems Daughter    • No Known Problems Half-Sister      SOCIAL HISTORY:  Social History     Socioeconomic History   • Marital status:    Tobacco Use   • Smoking status: Never   • Smokeless tobacco: Never   Substance and Sexual Activity   • Alcohol use: No   • Drug use: No   • Sexual activity: Yes     Partners: Male     Birth control/protection: None     GENETIC SCREENING:  Age >36 yo as of YUMI: No  Thalassemia: No  NTD: No  CHD: No  Down Syndrome/MR/Fragile X/Autism: No  Ashkenazi Yazidism with Luis-Sachs, [place], familial dysautonomia: No  Sickle cell disease or trait: No  Hemophilia: No  Muscular dystrophy: No  Cystic fibrosis: No  Gosper's chorea: No  Birth defects: No  Genetic/chromosomal disorders: No    INFECTION HISTORY:  TB exposure: No  HSV: No  Illness since LMP: No  Prior GBS infected child: No  STIs: No    ALLERGIES:  No Known Allergies    MEDICATIONS:  Prior to Admission medications    Medication Sig Start Date End Date Taking? Authorizing Provider   triamcinolone (KENALOG) 0.1 % cream APPLY as directed TO AFFECTED AREA TWICE DAILY (BODY, ARMS, AND LEGS) 9/14/22  Yes ProviderToya MD       PHYSICAL EXAM:   /60   Wt 72.8 kg (160 lb 6.4 oz)   LMP  02/15/2023 (Within Days)   BMI 27.53 kg/m²   General: Alert, healthy, no distress, well nourished and well developed.  Neurologic: Alert, oriented to person, place, and time.  Gait normal.  Cranial nerves II-XII grossly intact.  HEENT: Normocephalic, atraumatic.  Extraocular muscles intact, pupils equal and reactive x2.    Teeth: Normal hygiene.  atory effort.  Clear to auscultation bilaterally.  No wheezes, rhonci, or rales.  Heart: Regular rate and rhythm.  No murmer, rub or gallop.  Abdomen: Soft, non-tender, non-distended,no masses, no hepatosplenomegaly, no hernia.  Skin: No rash, no lesions.  Extremities: No cyanosis, clubbing or edema.    Prelim TVUS dating scan- Single IUP with CRL 8w4d , gestational sac WNL, both ovaries WNL    IMPRESSION:  Kenia Koch is a 22 y.o.  at 9w4d for a new prenatal visit.    PLAN:  1.  IUP at 9w2d  - Options counseling performed and patient desires continuation of pregnancy to term   - Prenatal labs reviewed  - Genetic testing, including cystic fibrosis, was discussed and patient would like to perform next week   - Continue prenatal vitamins  - Weight gain counseling performed.   - Pregravid BMI 25-29.9: Recommend 15-25 lb  - Return to clinic in 4 weeks for return prenatal visit  - Reviewed COVID-19 visitation policy  - Reviewed COVID-19 precautions     Diagnosis Plan   1. Encounter for supervision of other normal pregnancy in first trimester        2. 9 weeks gestation of pregnancy        3. Shoulder dystocia during labor and delivery          Naomi Santiago, KELLI  2023  14:47 CDT

## 2023-04-28 ENCOUNTER — LAB (OUTPATIENT)
Dept: LAB | Facility: OTHER | Age: 23
End: 2023-04-28
Payer: COMMERCIAL

## 2023-04-28 DIAGNOSIS — Z36.0 SCREENING FOR CHROMOSOMAL ANOMALIES BY AMNIOCENTESIS: Primary | ICD-10-CM

## 2023-04-28 DIAGNOSIS — Z36.0 ENCOUNTER FOR ANTENATAL SCREENING FOR CHROMOSOMAL ANOMALIES: ICD-10-CM

## 2023-04-28 PROCEDURE — 36415 COLL VENOUS BLD VENIPUNCTURE: CPT | Performed by: NURSE PRACTITIONER

## 2023-05-03 ENCOUNTER — LAB (OUTPATIENT)
Dept: LAB | Facility: OTHER | Age: 23
End: 2023-05-03
Payer: COMMERCIAL

## 2023-05-03 DIAGNOSIS — Z36.0 ENCOUNTER FOR ANTENATAL SCREENING FOR CHROMOSOMAL ANOMALIES: Primary | ICD-10-CM

## 2023-05-03 NOTE — PROGRESS NOTES
Collection tubes were not  for lab draw on . Box expiration was . [facility] is refusing to run the draw because it was received after . They are requiring a redraw. Pt should not be charged for the redraw.

## 2023-05-19 ENCOUNTER — ROUTINE PRENATAL (OUTPATIENT)
Dept: OBSTETRICS AND GYNECOLOGY | Facility: CLINIC | Age: 23
End: 2023-05-19
Payer: COMMERCIAL

## 2023-05-19 VITALS — WEIGHT: 157.4 LBS | BODY MASS INDEX: 27.02 KG/M2 | SYSTOLIC BLOOD PRESSURE: 110 MMHG | DIASTOLIC BLOOD PRESSURE: 66 MMHG

## 2023-05-19 DIAGNOSIS — Z36.3 ANTENATAL SCREENING FOR MALFORMATION USING ULTRASONICS: ICD-10-CM

## 2023-05-19 DIAGNOSIS — Z3A.13 13 WEEKS GESTATION OF PREGNANCY: ICD-10-CM

## 2023-05-19 DIAGNOSIS — Z34.81 ENCOUNTER FOR SUPERVISION OF OTHER NORMAL PREGNANCY IN FIRST TRIMESTER: Primary | ICD-10-CM

## 2023-05-19 NOTE — PROGRESS NOTES
CC: Prenatal visit    Kenia Koch is a 22 y.o.  at 13w2d.  Doing well.  Denies contractions, LOF, or VB.     No N/V.     NIPS low risk, GIRL.     /66   Wt 71.4 kg (157 lb 6.4 oz)   LMP 02/15/2023 (Within Days)   BMI 27.02 kg/m²        Fetal Heart Rate: 160     Problems (from 23 to present)     Problem Noted Resolved    Shoulder dystocia during labor and delivery 2023 by Naomi Santiago APRN No    Supervision of normal pregnancy 2023 by Naomi Santiago APRN No    Overview Addendum 2023  9:35 AM by Naomi Santiago APRN     A pos/Rubella immune / GBS @36 wks  Dating: LMP YUMI 23  Genetics: Low risk, GIRL  Tdap: @28wks  Flu: declines  Anatomy: @20wks  1h Glucola: @28wks  H&H/Plts:   Lab Results   Component Value Date    HGB 12.3 2023    HCT 36.6 2023     2023     Breast/BC undecided                A/P: Kenia Koch is a 22 y.o.  at 13w2d.  - RTC in 4 weeks, then 4 weeks after this for anatomy scan and FU with Dr. Mccarthy or Dr. Soler.       Diagnosis Plan   1. Encounter for supervision of other normal pregnancy in first trimester        2. Shoulder dystocia during labor and delivery        3. 13 weeks gestation of pregnancy        4.  screening for malformation using ultrasonics  US Ob 14 + Weeks Single or First Gestation        KELLI Go  2023  09:35 CDT

## 2023-05-22 LAB — SCAN RESULT: NORMAL

## 2023-06-16 ENCOUNTER — ROUTINE PRENATAL (OUTPATIENT)
Dept: OBSTETRICS AND GYNECOLOGY | Facility: CLINIC | Age: 23
End: 2023-06-16
Payer: COMMERCIAL

## 2023-06-16 VITALS — WEIGHT: 158.2 LBS | BODY MASS INDEX: 27.15 KG/M2 | DIASTOLIC BLOOD PRESSURE: 60 MMHG | SYSTOLIC BLOOD PRESSURE: 100 MMHG

## 2023-06-16 DIAGNOSIS — Z34.82 ENCOUNTER FOR SUPERVISION OF OTHER NORMAL PREGNANCY IN SECOND TRIMESTER: Primary | ICD-10-CM

## 2023-06-16 DIAGNOSIS — Z3A.17 17 WEEKS GESTATION OF PREGNANCY: ICD-10-CM

## 2023-06-16 NOTE — PROGRESS NOTES
CC: Prenatal visit    Kenia Koch is a 22 y.o.  at 17w2d.  Doing well.  Denies contractions, LOF, or VB.      /60   Wt 71.8 kg (158 lb 3.2 oz)   LMP 02/15/2023 (Within Days)   BMI 27.15 kg/m²        Fetal Heart Rate: 155     Problems (from 23 to present)       Problem Noted Resolved    Shoulder dystocia during labor and delivery 2023 by Naomi Santiago APRN No    Supervision of normal pregnancy 2023 by Naomi Santiago APRN No    Overview Addendum 2023  9:35 AM by Naomi Santiago APRN     A pos/Rubella immune / GBS @36 wks  Dating: LMP YUMI 23  Genetics: Low risk, GIRL  Tdap: @28wks  Flu: declines  Anatomy: @20wks  1h Glucola: @28wks  H&H/Plts:   Lab Results   Component Value Date    HGB 12.3 2023    HCT 36.6 2023     2023   Breast/BC undecided                  A/P: Kenia Koch is a 22 y.o.  at 17w2d.  - RTC in 4 weeks for anatomy scan and FU with Dr. Soler.   - RTC in 8 weeks here for 24wk appt       Diagnosis Plan   1. Encounter for supervision of other normal pregnancy in second trimester        2. Shoulder dystocia during labor and delivery        3. 17 weeks gestation of pregnancy          KELLI Go  2023  11:36 CDT

## 2023-08-18 ENCOUNTER — ROUTINE PRENATAL (OUTPATIENT)
Dept: OBSTETRICS AND GYNECOLOGY | Facility: CLINIC | Age: 23
End: 2023-08-18
Payer: COMMERCIAL

## 2023-08-18 VITALS — BODY MASS INDEX: 29.52 KG/M2 | WEIGHT: 172 LBS | DIASTOLIC BLOOD PRESSURE: 66 MMHG | SYSTOLIC BLOOD PRESSURE: 104 MMHG

## 2023-08-18 DIAGNOSIS — Z3A.26 26 WEEKS GESTATION OF PREGNANCY: ICD-10-CM

## 2023-08-18 DIAGNOSIS — Z34.82 ENCOUNTER FOR SUPERVISION OF OTHER NORMAL PREGNANCY IN SECOND TRIMESTER: Primary | ICD-10-CM

## 2023-08-18 PROCEDURE — 0502F SUBSEQUENT PRENATAL CARE: CPT | Performed by: NURSE PRACTITIONER

## 2023-08-18 NOTE — PROGRESS NOTES
CC: Prenatal visit    Kenia Koch is a 22 y.o.  at 26w2d.  Doing well.  Denies contractions, LOF, or VB.  Reports good FM.    /66   Wt 78 kg (172 lb)   LMP 02/15/2023 (Within Days)   BMI 29.52 kg/mý     Fundal Height (cm): 25 cm  Fetal Heart Rate: 153     Problems (from 23 to present)       Problem Noted Resolved    Shoulder dystocia during labor and delivery 2023 by Naomi Santiago APRN No    Supervision of normal pregnancy 2023 by Naomi Santiago APRN No    Overview Addendum 2023  9:35 AM by Naomi Santiago APRN     A pos/Rubella immune / GBS @36 wks  Dating: LMP YUMI 23  Genetics: Low risk, GIRL  Tdap: @28wks  Flu: declines  Anatomy: @20wks  1h Glucola: @28wks  H&H/Plts:   Lab Results   Component Value Date    HGB 12.3 2023    HCT 36.6 2023     2023   Breast/BC undecided                  A/P: Kenia Koch is a 22 y.o.  at 26w2d.  - RTC in in 4 weeks for 30wk appt with Dr. Soler.   - She will complete 1H GTT, CBC and TDAP in 2 weeks here in my office instead of having to go to [place] for it. Dr. Soler already ordered.   - RTC in 6 weeks for 32 week appt, already scheduled.      Diagnosis Plan   1. Encounter for supervision of other normal pregnancy in second trimester        2. Shoulder dystocia during labor and delivery        3. 26 weeks gestation of pregnancy          KELLI Go  2023  10:08 CDT

## 2023-09-15 ENCOUNTER — ROUTINE PRENATAL (OUTPATIENT)
Dept: OBSTETRICS AND GYNECOLOGY | Facility: CLINIC | Age: 23
End: 2023-09-15
Payer: COMMERCIAL

## 2023-09-15 ENCOUNTER — LAB (OUTPATIENT)
Dept: LAB | Facility: HOSPITAL | Age: 23
End: 2023-09-15
Payer: COMMERCIAL

## 2023-09-15 VITALS — WEIGHT: 181.8 LBS | DIASTOLIC BLOOD PRESSURE: 68 MMHG | BODY MASS INDEX: 31.21 KG/M2 | SYSTOLIC BLOOD PRESSURE: 112 MMHG

## 2023-09-15 DIAGNOSIS — Z34.83 ENCOUNTER FOR SUPERVISION OF OTHER NORMAL PREGNANCY IN THIRD TRIMESTER: Primary | ICD-10-CM

## 2023-09-15 DIAGNOSIS — Z34.82 ENCOUNTER FOR SUPERVISION OF OTHER NORMAL PREGNANCY IN SECOND TRIMESTER: ICD-10-CM

## 2023-09-15 DIAGNOSIS — Z3A.30 30 WEEKS GESTATION OF PREGNANCY: ICD-10-CM

## 2023-09-15 DIAGNOSIS — Z23 NEED FOR TDAP VACCINATION: ICD-10-CM

## 2023-09-15 LAB
DEPRECATED RDW RBC AUTO: 35.9 FL (ref 37–54)
ERYTHROCYTE [DISTWIDTH] IN BLOOD BY AUTOMATED COUNT: 13.8 % (ref 12.3–15.4)
GLUCOSE 1H P 100 G GLC PO SERPL-MCNC: 105 MG/DL (ref 65–139)
HCT VFR BLD AUTO: 29.9 % (ref 34–46.6)
HGB BLD-MCNC: 9.5 G/DL (ref 12–15.9)
MCH RBC QN AUTO: 23.1 PG (ref 26.6–33)
MCHC RBC AUTO-ENTMCNC: 31.8 G/DL (ref 31.5–35.7)
MCV RBC AUTO: 72.7 FL (ref 79–97)
PLATELET # BLD AUTO: 214 10*3/MM3 (ref 140–450)
PMV BLD AUTO: 10.8 FL (ref 6–12)
RBC # BLD AUTO: 4.11 10*6/MM3 (ref 3.77–5.28)
WBC NRBC COR # BLD: 11.9 10*3/MM3 (ref 3.4–10.8)

## 2023-09-15 PROCEDURE — 82950 GLUCOSE TEST: CPT

## 2023-09-15 PROCEDURE — 90471 IMMUNIZATION ADMIN: CPT | Performed by: OBSTETRICS & GYNECOLOGY

## 2023-09-15 PROCEDURE — 85027 COMPLETE CBC AUTOMATED: CPT

## 2023-09-15 PROCEDURE — 90715 TDAP VACCINE 7 YRS/> IM: CPT | Performed by: OBSTETRICS & GYNECOLOGY

## 2023-09-15 PROCEDURE — 36415 COLL VENOUS BLD VENIPUNCTURE: CPT

## 2023-09-15 PROCEDURE — 0502F SUBSEQUENT PRENATAL CARE: CPT | Performed by: OBSTETRICS & GYNECOLOGY

## 2023-09-15 NOTE — PROGRESS NOTES
CC: Prenatal visit    Kenia Koch is a 22 y.o.  at 30w2d.  Doing well.  Denies contractions, LOF, or VB.  Reports good FM.    /68   Wt 82.5 kg (181 lb 12.8 oz)   LMP 02/15/2023 (Within Days)   BMI 31.21 kg/m²   Fundal Height (cm): 30 cm  Fetal Heart Rate: 124     Problems (from 23 to present)       Problem Noted Resolved    Shoulder dystocia during labor and delivery 2023 by Naomi Santiago, KELLI No    Supervision of normal pregnancy 2023 by Naomi Santiago APRN No    Overview Addendum 2023  9:35 AM by Naomi Santiago APRN     A pos/Rubella immune / GBS @36 wks  Dating: LMP YUMI 23  Genetics: Low risk, GIRL  Tdap: @28wks  Flu: declines  Anatomy: @20wks  1h Glucola: @28wks  H&H/Plts:   Lab Results   Component Value Date    HGB 12.3 2023    HCT 36.6 2023     2023   Breast/BC undecided                A/P: Kenia Koch is a 22 y.o.  at 30w2d.  - Tdap today  - 3T labs today  - Declines breastpump script  - Contraception: BCP  - RTC in 2 weeks  - RTC in 4 weeks     Diagnosis Plan   1. Encounter for supervision of other normal pregnancy in third trimester        2. Shoulder dystocia during labor and delivery        3. 30 weeks gestation of pregnancy        4. Need for Tdap vaccination  Tdap Vaccine => 6yo IM (BOOSTRIX)        Jerri Soler MD  9/15/2023  09:30 CDT

## 2023-09-18 RX ORDER — FERROUS SULFATE 325(65) MG
325 TABLET ORAL 2 TIMES DAILY
Qty: 60 TABLET | Refills: 4 | Status: SHIPPED | OUTPATIENT
Start: 2023-09-18

## 2023-09-29 ENCOUNTER — ROUTINE PRENATAL (OUTPATIENT)
Dept: OBSTETRICS AND GYNECOLOGY | Facility: CLINIC | Age: 23
End: 2023-09-29
Payer: COMMERCIAL

## 2023-09-29 VITALS — BODY MASS INDEX: 31.82 KG/M2 | SYSTOLIC BLOOD PRESSURE: 120 MMHG | WEIGHT: 185.4 LBS | DIASTOLIC BLOOD PRESSURE: 68 MMHG

## 2023-09-29 DIAGNOSIS — Z3A.32 32 WEEKS GESTATION OF PREGNANCY: ICD-10-CM

## 2023-09-29 DIAGNOSIS — Z34.83 ENCOUNTER FOR SUPERVISION OF OTHER NORMAL PREGNANCY IN THIRD TRIMESTER: Primary | ICD-10-CM

## 2023-09-29 PROCEDURE — 0502F SUBSEQUENT PRENATAL CARE: CPT | Performed by: NURSE PRACTITIONER

## 2023-09-29 NOTE — PROGRESS NOTES
CC: Prenatal visit    Kenia Koch is a 22 y.o.  at 32w2d.  Doing well.  Denies contractions, LOF, or VB.  Reports good FM.    /68   Wt 84.1 kg (185 lb 6.4 oz)   LMP 02/15/2023 (Within Days)   BMI 31.82 kg/m²     Fundal Height (cm): 32 cm  Fetal Heart Rate: 157     Problems (from 23 to present)       Problem Noted Resolved    Shoulder dystocia during labor and delivery 2023 by Naomi Santiago APRN No    Supervision of normal pregnancy 2023 by Naomi Santiago APRN No    Overview Addendum 2023  9:35 AM by Naomi Santiago APRN     A pos/Rubella immune / GBS @36 wks  Dating: LMP YUMI 23  Genetics: Low risk, GIRL  Tdap: @28wks  Flu: declines  Anatomy: @20wks  1h Glucola: @28wks  H&H/Plts:   Lab Results   Component Value Date    HGB 12.3 2023    HCT 36.6 2023     2023   Breast/BC undecided                  A/P: Kenia Koch is a 22 y.o.  at 32w2d.  - RTC in 2 weeks for RPN   - Scheduled for next 2 appointments already.      Diagnosis Plan   1. Encounter for supervision of other normal pregnancy in third trimester        2. Shoulder dystocia during labor and delivery        3. 32 weeks gestation of pregnancy          KELLI Go  2023  06:00 CDT